# Patient Record
Sex: MALE | Race: WHITE | NOT HISPANIC OR LATINO | Employment: FULL TIME | URBAN - METROPOLITAN AREA
[De-identification: names, ages, dates, MRNs, and addresses within clinical notes are randomized per-mention and may not be internally consistent; named-entity substitution may affect disease eponyms.]

---

## 2017-02-10 RX ORDER — SILDENAFIL CITRATE 20 MG/1
TABLET ORAL
Qty: 30 TABLET | Refills: 0 | Status: SHIPPED | OUTPATIENT
Start: 2017-02-10 | End: 2017-03-07 | Stop reason: SDUPTHER

## 2017-02-10 RX ORDER — SILDENAFIL CITRATE 20 MG/1
TABLET ORAL
Qty: 90 TABLET | Refills: 0 | OUTPATIENT
Start: 2017-02-10

## 2017-03-07 ENCOUNTER — OFFICE VISIT (OUTPATIENT)
Dept: FAMILY MEDICINE CLINIC | Facility: CLINIC | Age: 62
End: 2017-03-07

## 2017-03-07 VITALS
SYSTOLIC BLOOD PRESSURE: 126 MMHG | OXYGEN SATURATION: 99 % | HEIGHT: 68 IN | BODY MASS INDEX: 27.28 KG/M2 | WEIGHT: 180 LBS | HEART RATE: 70 BPM | DIASTOLIC BLOOD PRESSURE: 84 MMHG

## 2017-03-07 DIAGNOSIS — M54.50 CHRONIC MIDLINE LOW BACK PAIN WITHOUT SCIATICA: ICD-10-CM

## 2017-03-07 DIAGNOSIS — F52.21 ED (ERECTILE DYSFUNCTION) OF NON-ORGANIC ORIGIN: ICD-10-CM

## 2017-03-07 DIAGNOSIS — E78.2 MIXED HYPERLIPIDEMIA: ICD-10-CM

## 2017-03-07 DIAGNOSIS — Z00.00 HEALTHCARE MAINTENANCE: Primary | ICD-10-CM

## 2017-03-07 DIAGNOSIS — M54.12 CERVICAL RADICULOPATHY: ICD-10-CM

## 2017-03-07 DIAGNOSIS — G89.29 CHRONIC MIDLINE LOW BACK PAIN WITHOUT SCIATICA: ICD-10-CM

## 2017-03-07 PROCEDURE — 99396 PREV VISIT EST AGE 40-64: CPT | Performed by: FAMILY MEDICINE

## 2017-03-07 PROCEDURE — 90715 TDAP VACCINE 7 YRS/> IM: CPT | Performed by: FAMILY MEDICINE

## 2017-03-07 PROCEDURE — 90471 IMMUNIZATION ADMIN: CPT | Performed by: FAMILY MEDICINE

## 2017-03-07 RX ORDER — SILDENAFIL CITRATE 20 MG/1
TABLET ORAL
Qty: 30 TABLET | Refills: 0 | Status: SHIPPED | OUTPATIENT
Start: 2017-03-07 | End: 2017-04-08 | Stop reason: SDUPTHER

## 2017-03-07 RX ORDER — SIMVASTATIN 40 MG
40 TABLET ORAL NIGHTLY
Qty: 90 TABLET | Refills: 3 | Status: SHIPPED | OUTPATIENT
Start: 2017-03-07 | End: 2018-04-03 | Stop reason: SDUPTHER

## 2017-03-07 NOTE — PROGRESS NOTES
"Subjective   Seth Montgomery is a 61 y.o. male.     Chief Complaint   Patient presents with   • Annual Exam     Social History   Substance Use Topics   • Smoking status: Current Every Day Smoker     Start date: 7/8/2015   • Smokeless tobacco: None   • Alcohol use None       History of Present Illness     Seth Montgomery 61 y.o. male who presents for yearly preventive exam.  He exercises none..  Last colon screening was colonoscopy 2 years ago without abnormalities.  Immunizations: not up to date - needs tdap, zostavax, and pneumovax  PSA was discussed and ordered He has no increased risk of prostate cancer  He does see his dentist regularly  His diet is in general, a \"healthy\" diet    He describes his alcohol intake as social drinker, down from last time he was in her.   His cardiovascular risk is: LDL goal is under 130    The following portions of the patient's history were reviewed and updated as appropriate: allergies, past medical history, past family history, surgeries, current medications, past social history and problem list.    Review of Systems   Constitutional: Negative for activity change, appetite change, chills, fatigue, fever and unexpected weight change.   HENT: Negative for congestion, ear pain, hearing loss, mouth sores, nosebleeds, rhinorrhea and sore throat.    Eyes: Negative for pain and visual disturbance.   Respiratory: Negative for cough, shortness of breath and wheezing.    Cardiovascular: Negative for chest pain, palpitations and leg swelling.   Gastrointestinal: Negative for abdominal distention, abdominal pain, blood in stool, constipation, diarrhea, nausea and vomiting.   Endocrine: Negative for cold intolerance and heat intolerance.   Genitourinary: Negative for difficulty urinating, discharge, dysuria, frequency, hematuria and urgency.   Musculoskeletal: Positive for arthralgias. Negative for back pain and joint swelling.   Skin: Negative for rash and wound.   Neurological: Positive for " "numbness (Tingling sensation in his right arm. Occurred with sleeping wrong. And his shoulder for the next two weeks hurt him as well. Some pain along the outside of his lateral arm. Occurred a month ago. ). Negative for dizziness, weakness and headaches.         long hx of spine pain-- four or five mos ago with LBP. Non-radiating. Dull, can't bend over to get to his shoes. Better with a shower and stretching.    Hematological: Does not bruise/bleed easily.   Psychiatric/Behavioral: Negative for confusion, dysphoric mood, sleep disturbance and suicidal ideas. The patient is not nervous/anxious.        Objective   Vitals:    03/07/17 1038   BP: 126/84   Pulse: 70   SpO2: 99%   Weight: 180 lb (81.6 kg)   Height: 68\" (172.7 cm)     Body mass index is 27.37 kg/(m^2).    Physical Exam   Constitutional: He is oriented to person, place, and time. He appears well-developed and well-nourished. No distress.   HENT:   Head: Normocephalic and atraumatic.   Right Ear: Tympanic membrane, external ear and ear canal normal.   Left Ear: Tympanic membrane, external ear and ear canal normal.   Mouth/Throat: Oropharynx is clear and moist.   Eyes: Conjunctivae are normal.   Neck: Neck supple. No thyromegaly present.   Cardiovascular: Normal rate, regular rhythm, normal heart sounds and intact distal pulses.    No murmur heard.  Pulmonary/Chest: Effort normal and breath sounds normal. No respiratory distress. He has no wheezes. He has no rales.   Abdominal: Soft. Bowel sounds are normal. He exhibits no distension and no mass. There is no tenderness.   Musculoskeletal: Normal range of motion. He exhibits no edema or deformity.   Tender, mildly, above the pelvic bones bilaterally   Lymphadenopathy:     He has no cervical adenopathy.   Neurological: He is alert and oriented to person, place, and time.   Skin: Skin is warm and dry.   Psychiatric: He has a normal mood and affect. His behavior is normal. Judgment and thought content normal. "   Vitals reviewed.      Assessment/Plan   Problem List Items Addressed This Visit        Cardiovascular and Mediastinum    HLD (hyperlipidemia)    Relevant Medications    simvastatin (ZOCOR) 40 MG tablet       Other    ED (erectile dysfunction) of non-organic origin    Relevant Medications    sildenafil (REVATIO) 20 MG tablet      Other Visit Diagnoses     Healthcare maintenance    -  Primary    Relevant Orders    Comprehensive metabolic panel    Lipid Panel With LDL/HDL Ratio    CBC and Differential    PSA    Cervical radiculopathy        If his symptoms persist he will need a MRI of the neck.  I've encouraged him to move more as he sitting with bad posture at a desk all day    Chronic midline low back pain without sciatica        This sounds very fixable with more activity.  He needs to be out on his bike.

## 2017-03-08 LAB
ALBUMIN SERPL-MCNC: 4.6 G/DL (ref 3.5–5.2)
ALBUMIN/GLOB SERPL: 2 G/DL
ALP SERPL-CCNC: 39 U/L (ref 39–117)
ALT SERPL-CCNC: 16 U/L (ref 1–41)
AST SERPL-CCNC: 16 U/L (ref 1–40)
BASOPHILS # BLD AUTO: 0.03 10*3/MM3 (ref 0–0.2)
BASOPHILS NFR BLD AUTO: 0.7 % (ref 0–1.5)
BILIRUB SERPL-MCNC: 0.4 MG/DL (ref 0.1–1.2)
BUN SERPL-MCNC: 17 MG/DL (ref 8–23)
BUN/CREAT SERPL: 19.8 (ref 7–25)
CALCIUM SERPL-MCNC: 10.3 MG/DL (ref 8.6–10.5)
CHLORIDE SERPL-SCNC: 102 MMOL/L (ref 98–107)
CHOLEST SERPL-MCNC: 251 MG/DL (ref 0–200)
CO2 SERPL-SCNC: 27.2 MMOL/L (ref 22–29)
CREAT SERPL-MCNC: 0.86 MG/DL (ref 0.76–1.27)
EOSINOPHIL # BLD AUTO: 0.07 10*3/MM3 (ref 0–0.7)
EOSINOPHIL NFR BLD AUTO: 1.7 % (ref 0.3–6.2)
ERYTHROCYTE [DISTWIDTH] IN BLOOD BY AUTOMATED COUNT: 12.5 % (ref 11.5–14.5)
GLOBULIN SER CALC-MCNC: 2.3 GM/DL
GLUCOSE SERPL-MCNC: 96 MG/DL (ref 65–99)
HCT VFR BLD AUTO: 42.6 % (ref 40.4–52.2)
HDLC SERPL-MCNC: 77 MG/DL (ref 40–60)
HGB BLD-MCNC: 14.5 G/DL (ref 13.7–17.6)
IMM GRANULOCYTES # BLD: 0 10*3/MM3 (ref 0–0.03)
IMM GRANULOCYTES NFR BLD: 0 % (ref 0–0.5)
LDLC SERPL CALC-MCNC: 156 MG/DL (ref 0–100)
LDLC/HDLC SERPL: 2.03 {RATIO}
LYMPHOCYTES # BLD AUTO: 1.65 10*3/MM3 (ref 0.9–4.8)
LYMPHOCYTES NFR BLD AUTO: 40.9 % (ref 19.6–45.3)
MCH RBC QN AUTO: 33.1 PG (ref 27–32.7)
MCHC RBC AUTO-ENTMCNC: 34 G/DL (ref 32.6–36.4)
MCV RBC AUTO: 97.3 FL (ref 79.8–96.2)
MONOCYTES # BLD AUTO: 0.35 10*3/MM3 (ref 0.2–1.2)
MONOCYTES NFR BLD AUTO: 8.7 % (ref 5–12)
NEUTROPHILS # BLD AUTO: 1.93 10*3/MM3 (ref 1.9–8.1)
NEUTROPHILS NFR BLD AUTO: 48 % (ref 42.7–76)
PLATELET # BLD AUTO: 239 10*3/MM3 (ref 140–500)
POTASSIUM SERPL-SCNC: 4.5 MMOL/L (ref 3.5–5.2)
PROT SERPL-MCNC: 6.9 G/DL (ref 6–8.5)
PSA SERPL-MCNC: 1.72 NG/ML (ref 0–4)
RBC # BLD AUTO: 4.38 10*6/MM3 (ref 4.6–6)
SODIUM SERPL-SCNC: 143 MMOL/L (ref 136–145)
TRIGL SERPL-MCNC: 89 MG/DL (ref 0–150)
VLDLC SERPL CALC-MCNC: 17.8 MG/DL (ref 5–40)
WBC # BLD AUTO: 4.03 10*3/MM3 (ref 4.5–10.7)

## 2017-04-08 DIAGNOSIS — F52.21 ED (ERECTILE DYSFUNCTION) OF NON-ORGANIC ORIGIN: ICD-10-CM

## 2017-04-10 RX ORDER — SILDENAFIL CITRATE 20 MG/1
TABLET ORAL
Qty: 30 TABLET | Refills: 0 | Status: SHIPPED | OUTPATIENT
Start: 2017-04-10 | End: 2017-05-26

## 2017-04-26 DIAGNOSIS — Z00.00 HEALTHCARE MAINTENANCE: Primary | ICD-10-CM

## 2017-04-27 ENCOUNTER — LAB (OUTPATIENT)
Dept: FAMILY MEDICINE CLINIC | Facility: CLINIC | Age: 62
End: 2017-04-27

## 2017-04-27 DIAGNOSIS — Z00.00 HEALTHCARE MAINTENANCE: ICD-10-CM

## 2017-04-27 LAB
BASOPHILS # BLD AUTO: 0.03 10*3/MM3 (ref 0–0.2)
BASOPHILS NFR BLD AUTO: 0.8 % (ref 0–1.5)
CHOLEST SERPL-MCNC: 218 MG/DL (ref 0–200)
EOSINOPHIL # BLD AUTO: 0.09 10*3/MM3 (ref 0–0.7)
EOSINOPHIL NFR BLD AUTO: 2.3 % (ref 0.3–6.2)
ERYTHROCYTE [DISTWIDTH] IN BLOOD BY AUTOMATED COUNT: 12.6 % (ref 11.5–14.5)
HCT VFR BLD AUTO: 42.1 % (ref 40.4–52.2)
HDLC SERPL-MCNC: 66 MG/DL (ref 40–60)
HGB BLD-MCNC: 13.8 G/DL (ref 13.7–17.6)
IMM GRANULOCYTES # BLD: 0 10*3/MM3 (ref 0–0.03)
IMM GRANULOCYTES NFR BLD: 0 % (ref 0–0.5)
LDLC SERPL CALC-MCNC: 123 MG/DL (ref 0–100)
LDLC/HDLC SERPL: 1.87 {RATIO}
LYMPHOCYTES # BLD AUTO: 1.65 10*3/MM3 (ref 0.9–4.8)
LYMPHOCYTES NFR BLD AUTO: 43 % (ref 19.6–45.3)
MCH RBC QN AUTO: 32.6 PG (ref 27–32.7)
MCHC RBC AUTO-ENTMCNC: 32.8 G/DL (ref 32.6–36.4)
MCV RBC AUTO: 99.5 FL (ref 79.8–96.2)
MONOCYTES # BLD AUTO: 0.26 10*3/MM3 (ref 0.2–1.2)
MONOCYTES NFR BLD AUTO: 6.8 % (ref 5–12)
NEUTROPHILS # BLD AUTO: 1.81 10*3/MM3 (ref 1.9–8.1)
NEUTROPHILS NFR BLD AUTO: 47.1 % (ref 42.7–76)
PLATELET # BLD AUTO: 183 10*3/MM3 (ref 140–500)
RBC # BLD AUTO: 4.23 10*6/MM3 (ref 4.6–6)
TRIGL SERPL-MCNC: 143 MG/DL (ref 0–150)
VLDLC SERPL CALC-MCNC: 28.6 MG/DL (ref 5–40)
WBC # BLD AUTO: 3.84 10*3/MM3 (ref 4.5–10.7)

## 2017-05-03 DIAGNOSIS — F52.21 ED (ERECTILE DYSFUNCTION) OF NON-ORGANIC ORIGIN: ICD-10-CM

## 2017-05-04 RX ORDER — SILDENAFIL CITRATE 20 MG/1
TABLET ORAL
Qty: 30 TABLET | Refills: 0 | Status: SHIPPED | OUTPATIENT
Start: 2017-05-04 | End: 2017-06-19 | Stop reason: SDUPTHER

## 2017-05-24 DIAGNOSIS — D72.819 LEUKOPENIA, UNSPECIFIED TYPE: Primary | ICD-10-CM

## 2017-05-26 ENCOUNTER — OFFICE VISIT (OUTPATIENT)
Dept: FAMILY MEDICINE CLINIC | Facility: CLINIC | Age: 62
End: 2017-05-26

## 2017-05-26 VITALS
HEART RATE: 68 BPM | HEIGHT: 68 IN | SYSTOLIC BLOOD PRESSURE: 118 MMHG | DIASTOLIC BLOOD PRESSURE: 78 MMHG | OXYGEN SATURATION: 99 % | WEIGHT: 182 LBS | BODY MASS INDEX: 27.58 KG/M2

## 2017-05-26 DIAGNOSIS — D70.9 NEUTROPENIA, UNSPECIFIED TYPE (HCC): ICD-10-CM

## 2017-05-26 DIAGNOSIS — K57.32 DIVERTICULITIS OF LARGE INTESTINE WITHOUT PERFORATION OR ABSCESS WITHOUT BLEEDING: Primary | ICD-10-CM

## 2017-05-26 PROBLEM — Z87.19 HISTORY OF DIVERTICULITIS: Status: ACTIVE | Noted: 2017-05-26

## 2017-05-26 PROCEDURE — 99214 OFFICE O/P EST MOD 30 MIN: CPT | Performed by: FAMILY MEDICINE

## 2017-06-19 DIAGNOSIS — F52.21 ED (ERECTILE DYSFUNCTION) OF NON-ORGANIC ORIGIN: ICD-10-CM

## 2017-06-19 RX ORDER — SILDENAFIL CITRATE 20 MG/1
TABLET ORAL
Qty: 30 TABLET | Refills: 0 | Status: SHIPPED | OUTPATIENT
Start: 2017-06-19 | End: 2017-08-31 | Stop reason: SDUPTHER

## 2017-06-23 ENCOUNTER — CONSULT (OUTPATIENT)
Dept: ONCOLOGY | Facility: CLINIC | Age: 62
End: 2017-06-23

## 2017-06-23 ENCOUNTER — LAB (OUTPATIENT)
Dept: LAB | Facility: HOSPITAL | Age: 62
End: 2017-06-23

## 2017-06-23 VITALS
HEIGHT: 67 IN | BODY MASS INDEX: 28.75 KG/M2 | OXYGEN SATURATION: 97 % | WEIGHT: 183.2 LBS | TEMPERATURE: 98.3 F | RESPIRATION RATE: 16 BRPM | DIASTOLIC BLOOD PRESSURE: 78 MMHG | SYSTOLIC BLOOD PRESSURE: 138 MMHG | HEART RATE: 80 BPM

## 2017-06-23 DIAGNOSIS — D72.819 LEUKOPENIA, UNSPECIFIED TYPE: Primary | ICD-10-CM

## 2017-06-23 DIAGNOSIS — D72.818 OTHER DECREASED WHITE BLOOD CELL (WBC) COUNT: Primary | ICD-10-CM

## 2017-06-23 LAB
BASOPHILS # BLD AUTO: 0.04 10*3/MM3 (ref 0–0.1)
BASOPHILS NFR BLD AUTO: 1 % (ref 0–1.1)
DEPRECATED RDW RBC AUTO: 42.4 FL (ref 37–49)
EOSINOPHIL # BLD AUTO: 0.02 10*3/MM3 (ref 0–0.36)
EOSINOPHIL NFR BLD AUTO: 0.5 % (ref 1–5)
ERYTHROCYTE [DISTWIDTH] IN BLOOD BY AUTOMATED COUNT: 12 % (ref 11.7–14.5)
HCT VFR BLD AUTO: 39.7 % (ref 40–49)
HGB BLD-MCNC: 13.7 G/DL (ref 13.5–16.5)
IMM GRANULOCYTES # BLD: 0.01 10*3/MM3 (ref 0–0.03)
IMM GRANULOCYTES NFR BLD: 0.3 % (ref 0–0.5)
LYMPHOCYTES # BLD AUTO: 1.27 10*3/MM3 (ref 1–3.5)
LYMPHOCYTES NFR BLD AUTO: 33.1 % (ref 20–49)
MCH RBC QN AUTO: 32.7 PG (ref 27–33)
MCHC RBC AUTO-ENTMCNC: 34.5 G/DL (ref 32–35)
MCV RBC AUTO: 94.7 FL (ref 83–97)
MONOCYTES # BLD AUTO: 0.31 10*3/MM3 (ref 0.25–0.8)
MONOCYTES NFR BLD AUTO: 8.1 % (ref 4–12)
NEUTROPHILS # BLD AUTO: 2.19 10*3/MM3 (ref 1.5–7)
NEUTROPHILS NFR BLD AUTO: 57 % (ref 39–75)
NRBC BLD MANUAL-RTO: 0 /100 WBC (ref 0–0)
PLATELET # BLD AUTO: 173 10*3/MM3 (ref 150–375)
PMV BLD AUTO: 10 FL (ref 8.9–12.1)
RBC # BLD AUTO: 4.19 10*6/MM3 (ref 4.3–5.5)
VIT B12 BLD-MCNC: 388 PG/ML (ref 250–999)
WBC NRBC COR # BLD: 3.84 10*3/MM3 (ref 4–10)

## 2017-06-23 PROCEDURE — 36415 COLL VENOUS BLD VENIPUNCTURE: CPT | Performed by: INTERNAL MEDICINE

## 2017-06-23 PROCEDURE — 99245 OFF/OP CONSLTJ NEW/EST HI 55: CPT | Performed by: INTERNAL MEDICINE

## 2017-06-23 PROCEDURE — 85025 COMPLETE CBC W/AUTO DIFF WBC: CPT | Performed by: INTERNAL MEDICINE

## 2017-06-23 PROCEDURE — 36416 COLLJ CAPILLARY BLOOD SPEC: CPT | Performed by: INTERNAL MEDICINE

## 2017-06-23 PROCEDURE — 82607 VITAMIN B-12: CPT | Performed by: INTERNAL MEDICINE

## 2017-06-23 NOTE — PROGRESS NOTES
Subjective .     REASON FOR CONSULTATION:   Leukocytopenia  Provide an opinion on any further workup or treatment                             REQUESTING PHYSICIAN: Yenny Mendoza MD  RECORDS OBTAINED:  Records of the patients history including those obtained from the referring provider were reviewed and summarized in detail.    HISTORY OF PRESENT ILLNESS:  The patient is a 62 y.o. year old male  who is here for follow-up with the above-mentioned history.    On 6/23/17 WBC 3.8.  A 4/27/17 WBC 3.8.  On 3/7/17 WBC 4  On 6/13/16 WBC 6.8.  On 1/8/16 WBC 5.1.  Hb and PLT unremarkable.  Differential unremarkable.    Denies fever or chills.  Denies unintentional significant weight loss  Denies drenching night sweats.  Denies recurrent or unusual infections.    Past Medical History:   Diagnosis Date   • Anxiety    • Depression    • Diverticula of colon    • Fasciculation    • H/O esophageal reflux    • H/O Fasciculations    • Hyperlipidemia    • Loss of hearing      Past Surgical History:   Procedure Laterality Date   • HERNIA REPAIR         HEMATOLOGIC/ONCOLOGIC HISTORY:  (History from previous dates can be found in the separate document.)    MEDICATIONS    Current Outpatient Prescriptions:   •  Calcium Carbonate Antacid (ANTACID PO), Take  by mouth As Needed., Disp: , Rfl:   •  Ibuprofen (ADVIL PO), Take  by mouth As Needed., Disp: , Rfl:   •  Nicotine Polacrilex (NICORETTE MT), Apply  to the mouth or throat As Needed., Disp: , Rfl:   •  sildenafil (REVATIO) 20 MG tablet, TAKE 1 TABLET BY MOUTH 30 MINUTES BEFORE NEED, Disp: 30 tablet, Rfl: 0  •  simvastatin (ZOCOR) 40 MG tablet, Take 1 tablet by mouth Every Night., Disp: 90 tablet, Rfl: 3    ALLERGIES:   No Known Allergies    SOCIAL HISTORY:       Social History     Social History   • Marital status:      Spouse name: [place]   • Number of children: N/A   • Years of education: N/A     Occupational History   •  Unknown Employer     Social History Main Topics   •  "Smoking status: Current Every Day Smoker     Start date: 7/8/2015   • Smokeless tobacco: Never Used      Comment: intermittently smokes, up to 1 PPD   • Alcohol use Yes      Comment: 2 glasses wine (1/2 bottle) per day   • Drug use: No   • Sexual activity: Not on file     Other Topics Concern   • Not on file     Social History Narrative         FAMILY HISTORY:  Family History   Problem Relation Age of Onset   • Transient ischemic attack Mother    • Breast cancer Mother 75   • Hypertension Father    • Von Willebrand disease Sister    • Ovarian cancer Maternal Grandmother 62       REVIEW OF SYSTEMS:  GENERAL: No change in appetite or weight;   No fevers, chills, sweats.    SKIN: No nonhealing lesions.   No rashes.  HEME/LYMPH: No easy bruising, bleeding.   No swollen nodes.   EYES: No vision changes or diplopia.   ENT: No tinnitus, hearing loss, gum bleeding, epistaxis, hoarseness or dysphagia.   RESPIRATORY: No cough, shortness of breath, hemoptysis or wheezing.   CVS: No chest pain, palpitations, orthopnea, dyspnea on exertion or PND.   GI: No melena or hematochezia.   No abdominal pain.  No nausea, vomiting, constipation, diarrhea  : No lower tract obstructive symptoms, dysuria or hematuria.   MUSCULOSKELETAL: No bone pain.  No joint stiffness.   NEUROLOGICAL: No global weakness, loss of consciousness or seizures.   PSYCHIATRIC: No increased nervousness, mood changes or depression.     Objective    Vitals:    06/23/17 1328   BP: 138/78   Pulse: 80   Resp: 16   Temp: 98.3 °F (36.8 °C)   SpO2: 97%   Weight: 183 lb 3.2 oz (83.1 kg)   Height: 67.32\" (171 cm)  Comment: new    PainSc: 0-No pain     Current Status 6/23/2017   ECOG score 0      PHYSICAL EXAM:    GENERAL:  Well-developed, well-nourished in no acute distress.   SKIN:  Warm, dry without rashes, purpura or petechiae.  EYES:  Pupils equal, round and reactive to light.  EOMs intact.  Conjunctivae normal.  EARS:  Hearing intact.  NOSE:  Septum midline.  No " excoriations or nasal discharge.  MOUTH:  Tongue is well-papillated; no stomatitis or ulcers.  Lips normal.  THROAT:  Oropharynx without lesions or exudates.  NECK:  Supple with good range of motion; no thyromegaly or masses, no JVD.  LYMPHATICS:  No cervical, supraclavicular, axillary or inguinal adenopathy.  CHEST:  Lungs clear to auscultation. Good airflow.  CARDIAC:  Regular rate and rhythm without murmurs, rubs or gallops. Normal S1,S2.  ABDOMEN:  Soft, nontender with no hepatosplenomegaly or masses.  EXTREMITIES:  No clubbing, cyanosis or edema.  NEUROLOGICAL:  Cranial Nerves II-XII grossly intact.  No focal neurological deficits.  PSYCHIATRIC:  Normal affect and mood.    RECENT LABS:        WBC   Date Value Ref Range Status   06/23/2017 3.84 (L) 4.00 - 10.00 10*3/mm3 Final   04/27/2017 3.84 (L) 4.50 - 10.70 10*3/mm3 Final   03/07/2017 4.03 (L) 4.50 - 10.70 10*3/mm3 Final   06/13/2016 6.84 4.50 - 10.70 10*3/mm3 Final   01/08/2016 5.1 3.4 - 10.8 x10E3/uL Final     Hemoglobin   Date Value Ref Range Status   06/23/2017 13.7 13.5 - 16.5 g/dL Final   04/27/2017 13.8 13.7 - 17.6 g/dL Final   03/07/2017 14.5 13.7 - 17.6 g/dL Final   06/13/2016 13.2 (L) 13.7 - 17.6 g/dL Final   01/08/2016 14.3 12.6 - 17.7 g/dL Final     Platelets   Date Value Ref Range Status   06/23/2017 173 150 - 375 10*3/mm3 Final   04/27/2017 183 140 - 500 10*3/mm3 Final   03/07/2017 239 140 - 500 10*3/mm3 Final   06/13/2016 219 140 - 500 10*3/mm3 Final   01/08/2016 222 150 - 379 x10E3/uL Final       Assessment/Plan   Other decreased white blood cell (WBC) count  - Vitamin B12  - Folate RBC  - CBC & Differential  - CBC & Differential  1.  Leukocytopenia.  Mild.  Hb and PLT unremarkable.  Differential unremarkable.  Peripheral smear personally reviewed by me and looks unremarkable.  Check B12 and folate studies.  I explained to the patient we could do a bone marrow biopsy for more information.  However, with his WBC being just mildly low and other  cell lines looking fine and peripheral smear looking unremarkable, I think it is reasonable to avoid putting him through a bone marrow biopsy and instead following his CBC.  He asked me I think I'll call could be contributing.  I explained it is possible that drinking half bottle of wine per night every night has caused leukocytopenia.  I explained I doubt stopping alcohol we correct his WBC.  However, I do think it would be beneficial to have a few days per week with no alcohol and ideally drink 1 glass of wine instead of 2.  I recommended he try and lose some weight (mildly overweight, carries his weight centrally). (again, i explained i don't think weight loss will improve wbc, but he wanted recommendations on ways to improve his health).    2.  Smoking. Recommended smoking cessation.    Plan  · B12 and folate labs today  · CBC with RN review 3 months  · M.D. CBC 6 months

## 2017-06-26 LAB
FOLATE BLD-MCNC: 350 NG/ML
FOLATE RBC-MCNC: 868 NG/ML
HCT VFR BLD AUTO: 40.3 % (ref 37.5–51)

## 2017-07-31 ENCOUNTER — OFFICE VISIT (OUTPATIENT)
Dept: FAMILY MEDICINE CLINIC | Facility: CLINIC | Age: 62
End: 2017-07-31

## 2017-07-31 VITALS
DIASTOLIC BLOOD PRESSURE: 78 MMHG | HEIGHT: 68 IN | BODY MASS INDEX: 29.55 KG/M2 | SYSTOLIC BLOOD PRESSURE: 114 MMHG | OXYGEN SATURATION: 100 % | HEART RATE: 66 BPM | WEIGHT: 195 LBS

## 2017-07-31 DIAGNOSIS — M54.2 ANTERIOR NECK PAIN: ICD-10-CM

## 2017-07-31 DIAGNOSIS — G44.019 EPISODIC CLUSTER HEADACHE, NOT INTRACTABLE: Primary | ICD-10-CM

## 2017-07-31 PROCEDURE — 99214 OFFICE O/P EST MOD 30 MIN: CPT | Performed by: FAMILY MEDICINE

## 2017-07-31 NOTE — PROGRESS NOTES
Seth Montgomery is a 62 y.o. male.  Seen 07/31/2017    Assessment/Plan   Problem List Items Addressed This Visit     None      Visit Diagnoses     Episodic cluster headache, not intractable    -  Primary    Relevant Orders    CT Head With & Without Contrast    SCM strain most likely diagnosis                 Return for Dependent on test results.  Patient Instructions   If neck discomfort doesn't go away he may need further w/u but believe the issue is strain.    Suspect CT head will be normal. Maybe a mild return of the cluster HA brought about by the new lifting.       Subjective     Chief Complaint   Patient presents with   • Neck Pain     x 3 weeks    • Headache     Social History   Substance Use Topics   • Smoking status: Current Every Day Smoker     Start date: 7/8/2015   • Smokeless tobacco: Never Used      Comment: intermittently smokes, up to 1 PPD   • Alcohol use Yes      Comment: 2 glasses wine (1/2 bottle) per day       History of Present Illness     Neck Pain: Paitent complains of neck pain. Event that precipitate these symptoms: none known. Onset of symptoms 3 weeks ago, unchanged since that time. Current symptoms are pain in anterior in character (pulsing in character; 1/10 in severity). Patient denies any other issues. Having some HAs that are in the left side of his face and upper scalp. Hx of migraines, possibly cluster many many years ago. Patient has had no prior neck problems.  Previous treatments include: none. Took advil. Will last for an hour or so and is reminiscent of the migraines he used to get. Has been lifting lately -- putting a canoe on his car. HA issues is the same time period.HA usually occurs in the afternoon. Not the morning.      The following portions of the patient's history were reviewed and updated as appropriate:PMHroutine: Social history , Allergies, Current Medications, Active Problem List and Health Maintenance    Review of Systems   Constitutional: Negative for activity  "change, appetite change, chills, fatigue, fever and unexpected weight change.   HENT: Negative for congestion, ear pain, hearing loss, mouth sores, nosebleeds, rhinorrhea and sore throat.    Eyes: Positive for pain. Negative for visual disturbance.   Respiratory: Negative for cough, shortness of breath and wheezing.    Cardiovascular: Negative for chest pain, palpitations and leg swelling.   Gastrointestinal: Negative for abdominal distention, abdominal pain, blood in stool, constipation, diarrhea, nausea and vomiting.   Endocrine: Negative for cold intolerance and heat intolerance.   Genitourinary: Negative for difficulty urinating, discharge, dysuria, frequency, hematuria and urgency.   Musculoskeletal: Positive for myalgias. Negative for back pain and joint swelling.   Skin: Negative for rash and wound.   Neurological: Positive for headaches. Negative for dizziness, weakness and numbness.   Hematological: Does not bruise/bleed easily.   Psychiatric/Behavioral: Negative for confusion, dysphoric mood, sleep disturbance and suicidal ideas. The patient is not nervous/anxious.        Objective   Vitals:    07/31/17 1159   BP: 114/78   Pulse: 66   SpO2: 100%   Weight: 195 lb (88.5 kg)   Height: 68\" (172.7 cm)     Body mass index is 29.65 kg/(m^2).  Physical Exam   Constitutional: He appears well-developed and well-nourished.   Musculoskeletal: Normal range of motion. He exhibits no tenderness.   Neurological: He is alert. No cranial nerve deficit. Coordination normal.   Psychiatric: He has a normal mood and affect. His behavior is normal. Judgment and thought content normal.   Vitals reviewed.    Reviewed Data:        "

## 2017-07-31 NOTE — PATIENT INSTRUCTIONS
If neck discomfort doesn't go away he may need further w/u but believe the issue is strain.    Suspect CT head will be normal. Maybe a mild return of the cluster HA brought about by the new lifting.

## 2017-08-03 ENCOUNTER — HOSPITAL ENCOUNTER (OUTPATIENT)
Dept: CT IMAGING | Facility: HOSPITAL | Age: 62
Discharge: HOME | End: 2017-08-03
Admitting: FAMILY MEDICINE

## 2017-08-03 DIAGNOSIS — G44.019 EPISODIC CLUSTER HEADACHE, NOT INTRACTABLE: ICD-10-CM

## 2017-08-03 LAB — CREAT BLDA-MCNC: 0.8 MG/DL (ref 0.6–1.3)

## 2017-08-03 PROCEDURE — 82565 ASSAY OF CREATININE: CPT

## 2017-08-03 PROCEDURE — 0 IOPAMIDOL PER 1 ML: Performed by: FAMILY MEDICINE

## 2017-08-03 PROCEDURE — 70470 CT HEAD/BRAIN W/O & W/DYE: CPT

## 2017-08-03 RX ADMIN — IOPAMIDOL 50 ML: 755 INJECTION, SOLUTION INTRAVENOUS at 13:45

## 2017-08-04 ENCOUNTER — TELEPHONE (OUTPATIENT)
Dept: FAMILY MEDICINE CLINIC | Facility: CLINIC | Age: 62
End: 2017-08-04

## 2017-08-31 DIAGNOSIS — F52.21 ED (ERECTILE DYSFUNCTION) OF NON-ORGANIC ORIGIN: ICD-10-CM

## 2017-08-31 RX ORDER — SILDENAFIL CITRATE 20 MG/1
TABLET ORAL
Qty: 30 TABLET | Refills: 0 | Status: SHIPPED | OUTPATIENT
Start: 2017-08-31 | End: 2017-08-31 | Stop reason: SDUPTHER

## 2017-09-01 DIAGNOSIS — F52.21 ED (ERECTILE DYSFUNCTION) OF NON-ORGANIC ORIGIN: ICD-10-CM

## 2017-09-01 RX ORDER — SILDENAFIL CITRATE 20 MG/1
TABLET ORAL
Qty: 90 TABLET | Refills: 0 | Status: SHIPPED | OUTPATIENT
Start: 2017-09-01 | End: 2017-12-08

## 2017-09-01 RX ORDER — SILDENAFIL CITRATE 20 MG/1
TABLET ORAL
Qty: 90 TABLET | Refills: 0 | OUTPATIENT
Start: 2017-09-01

## 2017-09-12 ENCOUNTER — LAB (OUTPATIENT)
Dept: LAB | Facility: HOSPITAL | Age: 62
End: 2017-09-12

## 2017-09-12 ENCOUNTER — CLINICAL SUPPORT (OUTPATIENT)
Dept: ONCOLOGY | Facility: HOSPITAL | Age: 62
End: 2017-09-12

## 2017-09-12 DIAGNOSIS — D72.818 OTHER DECREASED WHITE BLOOD CELL (WBC) COUNT: ICD-10-CM

## 2017-09-12 LAB
BASOPHILS # BLD AUTO: 0.04 10*3/MM3 (ref 0–0.1)
BASOPHILS NFR BLD AUTO: 0.8 % (ref 0–1.1)
DEPRECATED RDW RBC AUTO: 43.7 FL (ref 37–49)
EOSINOPHIL # BLD AUTO: 0.13 10*3/MM3 (ref 0–0.36)
EOSINOPHIL NFR BLD AUTO: 2.5 % (ref 1–5)
ERYTHROCYTE [DISTWIDTH] IN BLOOD BY AUTOMATED COUNT: 12.1 % (ref 11.7–14.5)
HCT VFR BLD AUTO: 42.3 % (ref 40–49)
HGB BLD-MCNC: 14.4 G/DL (ref 13.5–16.5)
IMM GRANULOCYTES # BLD: 0.05 10*3/MM3 (ref 0–0.03)
IMM GRANULOCYTES NFR BLD: 1 % (ref 0–0.5)
LYMPHOCYTES # BLD AUTO: 2.38 10*3/MM3 (ref 1–3.5)
LYMPHOCYTES NFR BLD AUTO: 46.6 % (ref 20–49)
MCH RBC QN AUTO: 32.7 PG (ref 27–33)
MCHC RBC AUTO-ENTMCNC: 34 G/DL (ref 32–35)
MCV RBC AUTO: 95.9 FL (ref 83–97)
MONOCYTES # BLD AUTO: 0.42 10*3/MM3 (ref 0.25–0.8)
MONOCYTES NFR BLD AUTO: 8.2 % (ref 4–12)
NEUTROPHILS # BLD AUTO: 2.09 10*3/MM3 (ref 1.5–7)
NEUTROPHILS NFR BLD AUTO: 40.9 % (ref 39–75)
NRBC BLD MANUAL-RTO: 0 /100 WBC (ref 0–0)
PLATELET # BLD AUTO: 195 10*3/MM3 (ref 150–375)
PMV BLD AUTO: 10 FL (ref 8.9–12.1)
RBC # BLD AUTO: 4.41 10*6/MM3 (ref 4.3–5.5)
WBC NRBC COR # BLD: 5.11 10*3/MM3 (ref 4–10)

## 2017-09-12 PROCEDURE — 85025 COMPLETE CBC W/AUTO DIFF WBC: CPT

## 2017-09-12 NOTE — PROGRESS NOTES
CBC results reviewed with pt. WBC 5.11, 14.4, . Also, reviewed results of vit b12 and folate that were drawn in June. Pt denies complaints today. Pt to return as scheduled in December for MD reardon. Pt encouraged to call with any complaints. He v/u. Copy of CBC results given to pt.

## 2017-10-18 DIAGNOSIS — F52.21 ED (ERECTILE DYSFUNCTION) OF NON-ORGANIC ORIGIN: ICD-10-CM

## 2017-10-18 RX ORDER — SILDENAFIL CITRATE 20 MG/1
TABLET ORAL
Qty: 30 TABLET | Refills: 0 | Status: SHIPPED | OUTPATIENT
Start: 2017-10-18 | End: 2017-10-18 | Stop reason: SDUPTHER

## 2017-10-19 RX ORDER — SILDENAFIL CITRATE 20 MG/1
TABLET ORAL
Qty: 90 TABLET | Refills: 0 | Status: SHIPPED | OUTPATIENT
Start: 2017-10-19 | End: 2018-05-23 | Stop reason: SDUPTHER

## 2017-12-08 ENCOUNTER — OFFICE VISIT (OUTPATIENT)
Dept: ONCOLOGY | Facility: CLINIC | Age: 62
End: 2017-12-08

## 2017-12-08 ENCOUNTER — LAB (OUTPATIENT)
Dept: LAB | Facility: HOSPITAL | Age: 62
End: 2017-12-08

## 2017-12-08 VITALS
SYSTOLIC BLOOD PRESSURE: 122 MMHG | WEIGHT: 178 LBS | TEMPERATURE: 98.2 F | HEART RATE: 66 BPM | RESPIRATION RATE: 16 BRPM | DIASTOLIC BLOOD PRESSURE: 70 MMHG | HEIGHT: 68 IN | BODY MASS INDEX: 26.98 KG/M2

## 2017-12-08 DIAGNOSIS — D72.818 OTHER DECREASED WHITE BLOOD CELL (WBC) COUNT: Primary | ICD-10-CM

## 2017-12-08 DIAGNOSIS — D72.818 OTHER DECREASED WHITE BLOOD CELL (WBC) COUNT: ICD-10-CM

## 2017-12-08 LAB
BASOPHILS # BLD AUTO: 0.05 10*3/MM3 (ref 0–0.1)
BASOPHILS NFR BLD AUTO: 1.3 % (ref 0–1.1)
DEPRECATED RDW RBC AUTO: 41 FL (ref 37–49)
EOSINOPHIL # BLD AUTO: 0.07 10*3/MM3 (ref 0–0.36)
EOSINOPHIL NFR BLD AUTO: 1.8 % (ref 1–5)
ERYTHROCYTE [DISTWIDTH] IN BLOOD BY AUTOMATED COUNT: 11.8 % (ref 11.7–14.5)
HCT VFR BLD AUTO: 42.7 % (ref 40–49)
HGB BLD-MCNC: 14.7 G/DL (ref 13.5–16.5)
IMM GRANULOCYTES # BLD: 0.01 10*3/MM3 (ref 0–0.03)
IMM GRANULOCYTES NFR BLD: 0.3 % (ref 0–0.5)
LYMPHOCYTES # BLD AUTO: 1.45 10*3/MM3 (ref 1–3.5)
LYMPHOCYTES NFR BLD AUTO: 37.4 % (ref 20–49)
MCH RBC QN AUTO: 32.5 PG (ref 27–33)
MCHC RBC AUTO-ENTMCNC: 34.4 G/DL (ref 32–35)
MCV RBC AUTO: 94.3 FL (ref 83–97)
MONOCYTES # BLD AUTO: 0.32 10*3/MM3 (ref 0.25–0.8)
MONOCYTES NFR BLD AUTO: 8.2 % (ref 4–12)
NEUTROPHILS # BLD AUTO: 1.98 10*3/MM3 (ref 1.5–7)
NEUTROPHILS NFR BLD AUTO: 51 % (ref 39–75)
NRBC BLD MANUAL-RTO: 0 /100 WBC (ref 0–0)
PLATELET # BLD AUTO: 201 10*3/MM3 (ref 150–375)
PMV BLD AUTO: 10 FL (ref 8.9–12.1)
RBC # BLD AUTO: 4.53 10*6/MM3 (ref 4.3–5.5)
WBC NRBC COR # BLD: 3.88 10*3/MM3 (ref 4–10)

## 2017-12-08 PROCEDURE — 99213 OFFICE O/P EST LOW 20 MIN: CPT | Performed by: INTERNAL MEDICINE

## 2017-12-08 PROCEDURE — 36416 COLLJ CAPILLARY BLOOD SPEC: CPT | Performed by: INTERNAL MEDICINE

## 2017-12-08 PROCEDURE — 85025 COMPLETE CBC W/AUTO DIFF WBC: CPT | Performed by: INTERNAL MEDICINE

## 2017-12-08 NOTE — PROGRESS NOTES
Subjective .     REASON FOR FOLLOWUP :   Leukocytopenia    HISTORY OF PRESENT ILLNESS:  The patient is a 62 y.o. year old male  who is here for follow-up with the above-mentioned history.    Denies fever or chills.  Denies unintentional significant weight loss  Denies drenching night sweats.  Denies any infections since his last visit.    Past Medical History:   Diagnosis Date   • Anxiety    • Depression    • Diverticula of colon    • Diverticulitis 2005   • Fasciculation    • H/O esophageal reflux    • H/O Fasciculations    • History of foreign travel     [place] [facility]   • Hyperlipidemia    • Loss of hearing      Past Surgical History:   Procedure Laterality Date   • HERNIA REPAIR         HEMATOLOGIC/ONCOLOGIC HISTORY:  (History from previous dates can be found in the separate document.)    MEDICATIONS    Current Outpatient Prescriptions:   •  Calcium Carbonate Antacid (ANTACID PO), Take  by mouth As Needed., Disp: , Rfl:   •  Ibuprofen (ADVIL PO), Take  by mouth As Needed., Disp: , Rfl:   •  Nicotine Polacrilex (NICORETTE MT), Apply  to the mouth or throat As Needed., Disp: , Rfl:   •  Probiotic Product (PROBIOTIC DAILY PO), Take  by mouth., Disp: , Rfl:   •  sildenafil (REVATIO) 20 MG tablet, TAKE 1 TABLET BY MOUTH 30 MINUTES BEFORE NEEDED, Disp: 90 tablet, Rfl: 0  •  simvastatin (ZOCOR) 40 MG tablet, Take 1 tablet by mouth Every Night., Disp: 90 tablet, Rfl: 3    ALLERGIES:   No Known Allergies    SOCIAL HISTORY:       Social History     Social History   • Marital status:      Spouse name: N/A   • Number of children: N/A   • Years of education: College     Occupational History   •       [employer]     Social History Main Topics   • Smoking status: Current Every Day Smoker     Packs/day: 0.50     Years: 5.50     Start date: 7/8/2015   • Smokeless tobacco: Never Used      Comment: intermittently smokes, up to 1 PPD   • Alcohol use 8.4 oz/week     14 Glasses of wine  "per week      Comment: 2 glasses wine (1/2 bottle) per day   • Drug use: No   • Sexual activity: Not on file     Other Topics Concern   • Not on file     Social History Narrative         FAMILY HISTORY:  Family History   Problem Relation Age of Onset   • Transient ischemic attack Mother    • Breast cancer Mother 75   • Von Willebrand disease Mother    • Hypertension Father    • Heart disease Father    • Von Willebrand disease Sister    • Ovarian cancer Maternal Grandmother 62       REVIEW OF SYSTEMS:  GENERAL: No change in appetite or weight;   No fevers, chills, sweats.    SKIN: No nonhealing lesions.   No rashes.  HEME/LYMPH: No easy bruising, bleeding.   No swollen nodes.   EYES: No vision changes or diplopia.   ENT: No tinnitus, hearing loss, gum bleeding, epistaxis, hoarseness or dysphagia.   RESPIRATORY: No cough, shortness of breath, hemoptysis or wheezing.   CVS: No chest pain, palpitations, orthopnea, dyspnea on exertion or PND.   GI: No melena or hematochezia.   No abdominal pain.  No nausea, vomiting, constipation, diarrhea  : No lower tract obstructive symptoms, dysuria or hematuria.   MUSCULOSKELETAL: No bone pain.  No joint stiffness.   NEUROLOGICAL: No global weakness, loss of consciousness or seizures.   PSYCHIATRIC: No increased nervousness, mood changes or depression.     Objective    Vitals:    12/08/17 1112   BP: 122/70   Pulse: 66   Resp: 16   Temp: 98.2 °F (36.8 °C)   Weight: 80.7 kg (178 lb)   Height: 172 cm (67.72\")  Comment: new ht.   PainSc: 0-No pain     Current Status 12/8/2017   ECOG score 0      PHYSICAL EXAM:    GENERAL:  Well-developed, well-nourished in no acute distress.   SKIN:  Warm, dry without rashes, purpura or petechiae.  EYES:  Pupils equal, round and reactive to light.  EOMs intact.  Conjunctivae normal.  EARS:  Hearing intact.  NOSE:  Septum midline.  No excoriations or nasal discharge.  MOUTH:  Tongue is well-papillated; no stomatitis or ulcers.  Lips normal.  THROAT:  " Oropharynx without lesions or exudates.  NECK:  Supple with good range of motion; no thyromegaly or masses, no JVD.  LYMPHATICS:  No cervical, supraclavicular, axillary or inguinal adenopathy.  CHEST:  Lungs clear to auscultation. Good airflow.  CARDIAC:  Regular rate and rhythm without murmurs, rubs or gallops. Normal S1,S2.  ABDOMEN:  Soft, nontender with no hepatosplenomegaly or masses.  EXTREMITIES:  No clubbing, cyanosis or edema.  NEUROLOGICAL:  Cranial Nerves II-XII grossly intact.  No focal neurological deficits.  PSYCHIATRIC:  Normal affect and mood.    RECENT LABS:        WBC   Date Value Ref Range Status   12/08/2017 3.88 (L) 4.00 - 10.00 10*3/mm3 Final   09/12/2017 5.11 4.00 - 10.00 10*3/mm3 Final   06/23/2017 3.84 (L) 4.00 - 10.00 10*3/mm3 Final   04/27/2017 3.84 (L) 4.50 - 10.70 10*3/mm3 Final   03/07/2017 4.03 (L) 4.50 - 10.70 10*3/mm3 Final   06/13/2016 6.84 4.50 - 10.70 10*3/mm3 Final   01/08/2016 5.1 3.4 - 10.8 x10E3/uL Final     Hemoglobin   Date Value Ref Range Status   12/08/2017 14.7 13.5 - 16.5 g/dL Final   09/12/2017 14.4 13.5 - 16.5 g/dL Final   06/23/2017 13.7 13.5 - 16.5 g/dL Final   04/27/2017 13.8 13.7 - 17.6 g/dL Final   03/07/2017 14.5 13.7 - 17.6 g/dL Final   06/13/2016 13.2 (L) 13.7 - 17.6 g/dL Final   01/08/2016 14.3 12.6 - 17.7 g/dL Final     Platelets   Date Value Ref Range Status   12/08/2017 201 150 - 375 10*3/mm3 Final   09/12/2017 195 150 - 375 10*3/mm3 Final   06/23/2017 173 150 - 375 10*3/mm3 Final   04/27/2017 183 140 - 500 10*3/mm3 Final   03/07/2017 239 140 - 500 10*3/mm3 Final   06/13/2016 219 140 - 500 10*3/mm3 Final   01/08/2016 222 150 - 379 x10E3/uL Final       Assessment/Plan   Other decreased white blood cell (WBC) count  - CBC & Differential  *Leukocytopenia.  Mild, Intermittent.  WBC 3.8-5.1 since at least April 2017.  Hb and PLT unremarkable.  Differential unremarkable.  B12 and folate unremarkable.  WBC mildly low today.    *Neutropenia.  So far, this is only  occurred once, April 2017 with ANC 1810 (normal range beginning in 1900).  ANC normal today.    *Smoking. Recommended smoking cessation.    *Regular alcohol use.  Drinks 2 glasses of wine 6 out of 7 nights which is an improvement from a prior 7 out of 7 nights.  He understands I recommend a few more days per week with no alcohol.    Plan  · M.D. CBC 1 year   · (I offered returning here as needed only with Dr. Mendoza monitoring CBC.  However, he prefers to return here, which is fine with me)

## 2018-02-06 DIAGNOSIS — F52.21 ED (ERECTILE DYSFUNCTION) OF NON-ORGANIC ORIGIN: ICD-10-CM

## 2018-02-06 RX ORDER — SILDENAFIL CITRATE 20 MG/1
TABLET ORAL
Qty: 30 TABLET | Refills: 0 | Status: SHIPPED | OUTPATIENT
Start: 2018-02-06 | End: 2018-05-23 | Stop reason: SDUPTHER

## 2018-04-03 DIAGNOSIS — E78.2 MIXED HYPERLIPIDEMIA: ICD-10-CM

## 2018-04-03 RX ORDER — SIMVASTATIN 40 MG
40 TABLET ORAL NIGHTLY
Qty: 30 TABLET | Refills: 0 | Status: SHIPPED | OUTPATIENT
Start: 2018-04-03 | End: 2018-07-02 | Stop reason: SDUPTHER

## 2018-05-23 DIAGNOSIS — F52.21 ED (ERECTILE DYSFUNCTION) OF NON-ORGANIC ORIGIN: ICD-10-CM

## 2018-05-23 RX ORDER — SILDENAFIL CITRATE 20 MG/1
TABLET ORAL
Qty: 90 TABLET | Refills: 0 | Status: SHIPPED | OUTPATIENT
Start: 2018-05-23 | End: 2018-07-02 | Stop reason: SDUPTHER

## 2018-06-25 ENCOUNTER — LAB (OUTPATIENT)
Dept: FAMILY MEDICINE CLINIC | Facility: CLINIC | Age: 63
End: 2018-06-25

## 2018-06-25 DIAGNOSIS — Z00.00 ANNUAL PHYSICAL EXAM: ICD-10-CM

## 2018-06-25 DIAGNOSIS — E78.5 HYPERLIPIDEMIA, UNSPECIFIED HYPERLIPIDEMIA TYPE: Primary | ICD-10-CM

## 2018-06-25 DIAGNOSIS — E78.5 HYPERLIPIDEMIA, UNSPECIFIED HYPERLIPIDEMIA TYPE: ICD-10-CM

## 2018-06-25 LAB
ALBUMIN SERPL-MCNC: 4.1 G/DL (ref 3.5–5.2)
ALBUMIN/GLOB SERPL: 2.1 G/DL
ALP SERPL-CCNC: 36 U/L (ref 39–117)
ALT SERPL-CCNC: 15 U/L (ref 1–41)
AST SERPL-CCNC: 16 U/L (ref 1–40)
BASOPHILS # BLD AUTO: 0.03 10*3/MM3 (ref 0–0.2)
BASOPHILS NFR BLD AUTO: 0.8 % (ref 0–1.5)
BILIRUB SERPL-MCNC: 0.7 MG/DL (ref 0.1–1.2)
BUN SERPL-MCNC: 20 MG/DL (ref 8–23)
BUN/CREAT SERPL: 22.7 (ref 7–25)
CALCIUM SERPL-MCNC: 10.1 MG/DL (ref 8.6–10.5)
CHLORIDE SERPL-SCNC: 103 MMOL/L (ref 98–107)
CHOLEST SERPL-MCNC: 196 MG/DL (ref 0–200)
CO2 SERPL-SCNC: 26.7 MMOL/L (ref 22–29)
CREAT SERPL-MCNC: 0.88 MG/DL (ref 0.76–1.27)
EOSINOPHIL # BLD AUTO: 0.11 10*3/MM3 (ref 0–0.7)
EOSINOPHIL NFR BLD AUTO: 2.9 % (ref 0.3–6.2)
ERYTHROCYTE [DISTWIDTH] IN BLOOD BY AUTOMATED COUNT: 12.8 % (ref 11.5–14.5)
GFR SERPLBLD CREATININE-BSD FMLA CKD-EPI: 106 ML/MIN/1.73
GFR SERPLBLD CREATININE-BSD FMLA CKD-EPI: 88 ML/MIN/1.73
GLOBULIN SER CALC-MCNC: 2 GM/DL
GLUCOSE SERPL-MCNC: 93 MG/DL (ref 65–99)
HCT VFR BLD AUTO: 43 % (ref 40.4–52.2)
HDLC SERPL-MCNC: 65 MG/DL (ref 40–60)
HGB BLD-MCNC: 14.4 G/DL (ref 13.7–17.6)
IMM GRANULOCYTES # BLD: 0 10*3/MM3 (ref 0–0.03)
IMM GRANULOCYTES NFR BLD: 0 % (ref 0–0.5)
LDLC SERPL CALC-MCNC: 109 MG/DL (ref 0–100)
LDLC/HDLC SERPL: 1.67 {RATIO}
LYMPHOCYTES # BLD AUTO: 1.61 10*3/MM3 (ref 0.9–4.8)
LYMPHOCYTES NFR BLD AUTO: 42.5 % (ref 19.6–45.3)
MCH RBC QN AUTO: 33.3 PG (ref 27–32.7)
MCHC RBC AUTO-ENTMCNC: 33.5 G/DL (ref 32.6–36.4)
MCV RBC AUTO: 99.3 FL (ref 79.8–96.2)
MONOCYTES # BLD AUTO: 0.34 10*3/MM3 (ref 0.2–1.2)
MONOCYTES NFR BLD AUTO: 9 % (ref 5–12)
NEUTROPHILS # BLD AUTO: 1.7 10*3/MM3 (ref 1.9–8.1)
NEUTROPHILS NFR BLD AUTO: 44.8 % (ref 42.7–76)
PLATELET # BLD AUTO: 194 10*3/MM3 (ref 140–500)
POTASSIUM SERPL-SCNC: 4.5 MMOL/L (ref 3.5–5.2)
PROT SERPL-MCNC: 6.1 G/DL (ref 6–8.5)
RBC # BLD AUTO: 4.33 10*6/MM3 (ref 4.6–6)
SODIUM SERPL-SCNC: 141 MMOL/L (ref 136–145)
TRIGL SERPL-MCNC: 111 MG/DL (ref 0–150)
VLDLC SERPL CALC-MCNC: 22.2 MG/DL (ref 5–40)
WBC # BLD AUTO: 3.79 10*3/MM3 (ref 4.5–10.7)

## 2018-07-02 ENCOUNTER — OFFICE VISIT (OUTPATIENT)
Dept: FAMILY MEDICINE CLINIC | Facility: CLINIC | Age: 63
End: 2018-07-02

## 2018-07-02 VITALS
DIASTOLIC BLOOD PRESSURE: 72 MMHG | HEART RATE: 70 BPM | BODY MASS INDEX: 27.89 KG/M2 | SYSTOLIC BLOOD PRESSURE: 112 MMHG | HEIGHT: 68 IN | RESPIRATION RATE: 16 BRPM | WEIGHT: 184 LBS | OXYGEN SATURATION: 98 %

## 2018-07-02 DIAGNOSIS — E78.2 MIXED HYPERLIPIDEMIA: ICD-10-CM

## 2018-07-02 DIAGNOSIS — F52.21 ED (ERECTILE DYSFUNCTION) OF NON-ORGANIC ORIGIN: ICD-10-CM

## 2018-07-02 DIAGNOSIS — Z00.00 HEALTHCARE MAINTENANCE: Primary | ICD-10-CM

## 2018-07-02 PROCEDURE — 99396 PREV VISIT EST AGE 40-64: CPT | Performed by: FAMILY MEDICINE

## 2018-07-02 PROCEDURE — 90732 PPSV23 VACC 2 YRS+ SUBQ/IM: CPT | Performed by: FAMILY MEDICINE

## 2018-07-02 PROCEDURE — 90632 HEPA VACCINE ADULT IM: CPT | Performed by: FAMILY MEDICINE

## 2018-07-02 PROCEDURE — 90472 IMMUNIZATION ADMIN EACH ADD: CPT | Performed by: FAMILY MEDICINE

## 2018-07-02 PROCEDURE — 90471 IMMUNIZATION ADMIN: CPT | Performed by: FAMILY MEDICINE

## 2018-07-02 RX ORDER — SIMVASTATIN 40 MG
40 TABLET ORAL NIGHTLY
Qty: 90 TABLET | Refills: 3 | Status: SHIPPED | OUTPATIENT
Start: 2018-07-02 | End: 2019-11-13 | Stop reason: SDUPTHER

## 2018-07-02 RX ORDER — SILDENAFIL CITRATE 20 MG/1
TABLET ORAL
Qty: 30 TABLET | Refills: 11 | Status: SHIPPED | OUTPATIENT
Start: 2018-07-02 | End: 2019-05-03 | Stop reason: SDUPTHER

## 2018-07-02 NOTE — PROGRESS NOTES
"  Problem List Items Addressed This Visit        Cardiovascular and Mediastinum    HLD (hyperlipidemia)    Relevant Medications    simvastatin (ZOCOR) 40 MG tablet       Other    ED (erectile dysfunction) of non-organic origin    Relevant Medications    sildenafil (REVATIO) 20 MG tablet      Other Visit Diagnoses     Healthcare maintenance    -  Primary             Return in about 1 year (around 7/2/2019).  Patient Instructions   Get a Shingrex.     Recommended that he reduce his alcohol consumption. This would     Seth Montgomery is a 63 y.o. male being seen in our office today for Annual Exam                 He  reports that he has been smoking.  He started smoking about 2 years ago. He has a 2.75 pack-year smoking history. He has never used smokeless tobacco. He reports that he drinks about 8.4 oz of alcohol per week . He reports that he does not use drugs.             HPI Seth Montgomery 63 y.o. male who presents for yearly preventive exam.  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  History; colonoscopy: Last colonoscopy: colonoscopy 2 years ago without abnormalities.  Immunizations: not up to date - hep A, pneumovax today. Rec Shingrex  PSA was reviewed He has no increased risk of prostate cancer  He does see his dentist regularly  His diet is in general, a \"healthy\" diet    He describes his alcohol intake as glass of wine with dinner  His cardiovascular risk is: LDL goal is under 130  This patient has ever been tested for HepC: yes              The following portions of the patient's history were reviewed and updated as appropriate:PMHroutine: Social history , Past Medical History, Surgical history , Allergies, Current Medications, Active Problem List, Family History and Health Maintenance            Review of Systems   Constitutional: Negative for activity change, appetite change, chills, fatigue, fever and unexpected weight change.   HENT: Negative for congestion, ear pain, hearing " loss, mouth sores, nosebleeds, rhinorrhea and sore throat.    Eyes: Negative for pain and visual disturbance.   Respiratory: Negative for cough, shortness of breath and wheezing.    Cardiovascular: Negative for chest pain, palpitations and leg swelling.   Gastrointestinal: Negative for abdominal distention, abdominal pain, blood in stool, constipation, diarrhea, nausea and vomiting.        Having some gurgling stomach episodes. Hot sauces, onions, beans, etc. Does know that alcohol may make it worse. Antacids help but is persistent. E5aaitcnly don't help. Hasn't tried PPIs. Discussed pros/cons of using those.    Endocrine: Negative for cold intolerance and heat intolerance.   Genitourinary: Negative for difficulty urinating, discharge, dysuria, frequency, hematuria and urgency.   Musculoskeletal: Negative for back pain and joint swelling.        Pain above clavicle toward the neck   Skin: Negative for rash and wound.   Neurological: Negative for dizziness, weakness, numbness and headaches.   Hematological: Does not bruise/bleed easily.   Psychiatric/Behavioral: Negative for confusion, dysphoric mood, sleep disturbance and suicidal ideas. The patient is not nervous/anxious.                  BP Readings from Last 1 Encounters:   07/02/18 112/72     Wt Readings from Last 3 Encounters:   07/02/18 83.5 kg (184 lb)   03/27/18 80.3 kg (177 lb)   12/08/17 80.7 kg (178 lb)   Body mass index is 28.39 kg/m².                 Physical Exam   Constitutional: He is oriented to person, place, and time. He appears well-developed and well-nourished. No distress.   HENT:   Head: Normocephalic and atraumatic.   Right Ear: Tympanic membrane, external ear and ear canal normal.   Left Ear: Tympanic membrane, external ear and ear canal normal.   Mouth/Throat: Oropharynx is clear and moist.   Eyes: Conjunctivae are normal.   Neck: Neck supple. No thyromegaly present.   Cardiovascular: Normal rate, regular rhythm, normal heart sounds and  intact distal pulses.    No murmur heard.  Pulmonary/Chest: Effort normal and breath sounds normal. No respiratory distress. He has no wheezes. He has no rales.   Abdominal: Soft. Bowel sounds are normal. He exhibits no distension and no mass. There is no tenderness.   Musculoskeletal: Normal range of motion. He exhibits no edema or deformity.   Lymphadenopathy:     He has no cervical adenopathy.   Neurological: He is alert and oriented to person, place, and time.   Skin: Skin is warm and dry.   Psychiatric: He has a normal mood and affect. His behavior is normal. Judgment and thought content normal.   Vitals reviewed.              Lab on 06/25/2018   Component Date Value Ref Range Status   • WBC 06/25/2018 3.79* 4.50 - 10.70 10*3/mm3 Final   • RBC 06/25/2018 4.33* 4.60 - 6.00 10*6/mm3 Final   • Hemoglobin 06/25/2018 14.4  13.7 - 17.6 g/dL Final   • Hematocrit 06/25/2018 43.0  40.4 - 52.2 % Final   • MCV 06/25/2018 99.3* 79.8 - 96.2 fL Final   • MCH 06/25/2018 33.3* 27.0 - 32.7 pg Final   • MCHC 06/25/2018 33.5  32.6 - 36.4 g/dL Final   • RDW 06/25/2018 12.8  11.5 - 14.5 % Final   • Platelets 06/25/2018 194  140 - 500 10*3/mm3 Final   • Neutrophil Rel % 06/25/2018 44.8  42.7 - 76.0 % Final   • Lymphocyte Rel % 06/25/2018 42.5  19.6 - 45.3 % Final   • Monocyte Rel % 06/25/2018 9.0  5.0 - 12.0 % Final   • Eosinophil Rel % 06/25/2018 2.9  0.3 - 6.2 % Final   • Basophil Rel % 06/25/2018 0.8  0.0 - 1.5 % Final   • Neutrophils Absolute 06/25/2018 1.70* 1.90 - 8.10 10*3/mm3 Final   • Lymphocytes Absolute 06/25/2018 1.61  0.90 - 4.80 10*3/mm3 Final   • Monocytes Absolute 06/25/2018 0.34  0.20 - 1.20 10*3/mm3 Final   • Eosinophils Absolute 06/25/2018 0.11  0.00 - 0.70 10*3/mm3 Final   • Basophils Absolute 06/25/2018 0.03  0.00 - 0.20 10*3/mm3 Final   • Immature Granulocyte Rel % 06/25/2018 0.0  0.0 - 0.5 % Final   • Immature Grans Absolute 06/25/2018 0.00  0.00 - 0.03 10*3/mm3 Final   • Glucose 06/25/2018 93  65 - 99 mg/dL  Final   • BUN 06/25/2018 20  8 - 23 mg/dL Final   • Creatinine 06/25/2018 0.88  0.76 - 1.27 mg/dL Final   • eGFR Non  Am 06/25/2018 88  >60 mL/min/1.73 Final   • eGFR African Am 06/25/2018 106  >60 mL/min/1.73 Final   • BUN/Creatinine Ratio 06/25/2018 22.7  7.0 - 25.0 Final   • Sodium 06/25/2018 141  136 - 145 mmol/L Final   • Potassium 06/25/2018 4.5  3.5 - 5.2 mmol/L Final   • Chloride 06/25/2018 103  98 - 107 mmol/L Final   • Total CO2 06/25/2018 26.7  22.0 - 29.0 mmol/L Final   • Calcium 06/25/2018 10.1  8.6 - 10.5 mg/dL Final   • Total Protein 06/25/2018 6.1  6.0 - 8.5 g/dL Final   • Albumin 06/25/2018 4.10  3.50 - 5.20 g/dL Final   • Globulin 06/25/2018 2.0  gm/dL Final   • A/G Ratio 06/25/2018 2.1  g/dL Final   • Total Bilirubin 06/25/2018 0.7  0.1 - 1.2 mg/dL Final   • Alkaline Phosphatase 06/25/2018 36* 39 - 117 U/L Final   • AST (SGOT) 06/25/2018 16  1 - 40 U/L Final   • ALT (SGPT) 06/25/2018 15  1 - 41 U/L Final   • Total Cholesterol 06/25/2018 196  0 - 200 mg/dL Final   • Triglycerides 06/25/2018 111  0 - 150 mg/dL Final   • HDL Cholesterol 06/25/2018 65* 40 - 60 mg/dL Final   • VLDL Cholesterol 06/25/2018 22.2  5 - 40 mg/dL Final   • LDL Cholesterol  06/25/2018 109* 0 - 100 mg/dL Final   • LDL/HDL Ratio 06/25/2018 1.67   Final

## 2018-08-29 ENCOUNTER — OFFICE VISIT (OUTPATIENT)
Dept: FAMILY MEDICINE CLINIC | Facility: CLINIC | Age: 63
End: 2018-08-29

## 2018-08-29 ENCOUNTER — TELEPHONE (OUTPATIENT)
Dept: GASTROENTEROLOGY | Facility: CLINIC | Age: 63
End: 2018-08-29

## 2018-08-29 ENCOUNTER — HOSPITAL ENCOUNTER (OUTPATIENT)
Dept: CT IMAGING | Facility: HOSPITAL | Age: 63
Discharge: HOME | End: 2018-08-29
Admitting: NURSE PRACTITIONER

## 2018-08-29 VITALS
HEIGHT: 68 IN | BODY MASS INDEX: 28.04 KG/M2 | DIASTOLIC BLOOD PRESSURE: 80 MMHG | OXYGEN SATURATION: 98 % | RESPIRATION RATE: 16 BRPM | HEART RATE: 80 BPM | WEIGHT: 185 LBS | SYSTOLIC BLOOD PRESSURE: 128 MMHG

## 2018-08-29 DIAGNOSIS — K57.92 DIVERTICULITIS OF INTESTINE, UNSPECIFIED BLEEDING STATUS, UNSPECIFIED COMPLICATION STATUS, UNSPECIFIED PART OF INTESTINAL TRACT: ICD-10-CM

## 2018-08-29 DIAGNOSIS — R10.32 LEFT LOWER QUADRANT PAIN: Primary | ICD-10-CM

## 2018-08-29 DIAGNOSIS — R10.32 LEFT LOWER QUADRANT PAIN: ICD-10-CM

## 2018-08-29 LAB — CREAT BLDA-MCNC: 0.9 MG/DL (ref 0.6–1.3)

## 2018-08-29 PROCEDURE — 25010000002 IOPAMIDOL 61 % SOLUTION: Performed by: NURSE PRACTITIONER

## 2018-08-29 PROCEDURE — 0 DIATRIZOATE MEGLUMINE & SODIUM PER 1 ML: Performed by: NURSE PRACTITIONER

## 2018-08-29 PROCEDURE — 82565 ASSAY OF CREATININE: CPT

## 2018-08-29 PROCEDURE — 74177 CT ABD & PELVIS W/CONTRAST: CPT

## 2018-08-29 PROCEDURE — 99214 OFFICE O/P EST MOD 30 MIN: CPT | Performed by: NURSE PRACTITIONER

## 2018-08-29 RX ORDER — METRONIDAZOLE 500 MG/1
500 TABLET ORAL 3 TIMES DAILY
Qty: 30 TABLET | Refills: 0 | Status: SHIPPED | OUTPATIENT
Start: 2018-08-29 | End: 2018-10-22 | Stop reason: HOSPADM

## 2018-08-29 RX ORDER — CIPROFLOXACIN 500 MG/1
500 TABLET, FILM COATED ORAL EVERY 12 HOURS SCHEDULED
Qty: 20 TABLET | Refills: 0 | Status: SHIPPED | OUTPATIENT
Start: 2018-08-29 | End: 2018-09-18

## 2018-08-29 RX ORDER — ONDANSETRON 4 MG/1
4 TABLET, FILM COATED ORAL EVERY 8 HOURS PRN
Qty: 20 TABLET | Refills: 0 | Status: SHIPPED | OUTPATIENT
Start: 2018-08-29 | End: 2018-11-15

## 2018-08-29 RX ADMIN — IOPAMIDOL 85 ML: 612 INJECTION, SOLUTION INTRAVENOUS at 16:00

## 2018-08-29 RX ADMIN — DIATRIZOATE MEGLUMINE AND DIATRIZOATE SODIUM 30 ML: 660; 100 LIQUID ORAL; RECTAL at 15:00

## 2018-08-29 NOTE — PROGRESS NOTES
Seth Montgomery is a 63 y.o. male. Pt is here for L low abdominal pain. He says on pain 5 days, mainly in the morning. Pain is light, he can feel it when walking and movements. He had during this past two weeks a few abnormal bowel movements that alternate diarrhea and constipation. Denies fever. Abdominal Pain: Patient complains of abdominal pain. The pain is described as colicky and cramping, and is 5/10 in intensity. Pain is located in the LLQ without radiation. Onset was a few days ago. Symptoms have been unchanged since. Aggravating factors: none.  Alleviating factors: NSAIDs. Associated symptoms: diarrhea. The patient denies anorexia, dysuria, fever, flatus and melena. Last colonoscopy 3 years ago. New patient to me - pt of Dr Mendoza - pt has not had an exacerbation in 2-3 years.   Pt is following with Dr Hermes BRUNO and has communicated with them.  Diverticulitis: Patient complains of diffuse abdominal pain.  The pain is described as colicky, and is 5/10 in intensity. Onset was 5 days ago. Symptoms have been unchanged since. Aggravating factors: eating and spicy foods.  Alleviating factors: NSAIDs and sitting up. Associated symptoms: diarrhea and nausea. The patient denies anorexia, chills, constipation, melena, night sweats and vomiting.    Advised to go to ER with worsening pain or rectal bleeding  Assessment/Plan   Problem List Items Addressed This Visit     None      Visit Diagnoses     Left lower quadrant pain    -  Primary    Relevant Medications    metroNIDAZOLE (FLAGYL) 500 MG tablet    ciprofloxacin (CIPRO) 500 MG tablet    ondansetron (ZOFRAN) 4 MG tablet    Other Relevant Orders    CT Abdomen Pelvis With & Without Contrast    CT Abdomen Pelvis With Contrast    Diverticulitis of intestine, unspecified bleeding status, unspecified complication status, unspecified part of intestinal tract        Relevant Medications    metroNIDAZOLE (FLAGYL) 500 MG tablet    ciprofloxacin (CIPRO) 500 MG tablet     ondansetron (ZOFRAN) 4 MG tablet    Other Relevant Orders    CT Abdomen Pelvis With & Without Contrast    CT Abdomen Pelvis With Contrast             Return for Recheck.  Patient Instructions   Diverticulitis  Diverticulitis is infection or inflammation of small pouches (diverticula) in the colon that form due to a condition called diverticulosis. Diverticula can trap stool (feces) and bacteria, causing infection and inflammation.  Diverticulitis may cause severe stomach pain and diarrhea. It may lead to tissue damage in the colon that causes bleeding. The diverticula may also burst (rupture) and cause infected stool to enter other areas of the abdomen.  Complications of diverticulitis can include:  · Bleeding.  · Severe infection.  · Severe pain.  · Rupture (perforation) of the colon.  · Blockage (obstruction) of the colon.    What are the causes?  This condition is caused by stool becoming trapped in the diverticula, which allows bacteria to grow in the diverticula. This leads to inflammation and infection.  What increases the risk?  You are more likely to develop this condition if:  · You have diverticulosis. The risk for diverticulosis increases if:  ? You are overweight or obese.  ? You use tobacco products.  ? You do not get enough exercise.  · You eat a diet that does not include enough fiber. High-fiber foods include fruits, vegetables, beans, nuts, and whole grains.    What are the signs or symptoms?  Symptoms of this condition may include:  · Pain and tenderness in the abdomen. The pain is normally located on the left side of the abdomen, but it may occur in other areas.  · Fever and chills.  · Bloating.  · Cramping.  · Nausea.  · Vomiting.  · Changes in bowel routines.  · Blood in your stool.    How is this diagnosed?  This condition is diagnosed based on:  · Your medical history.  · A physical exam.  · Tests to make sure there is nothing else causing your condition. These tests may include:  ? Blood  tests.  ? Urine tests.  ? Imaging tests of the abdomen, including X-rays, ultrasounds, MRIs, or CT scans.    How is this treated?  Most cases of this condition are mild and can be treated at home. Treatment may include:  · Taking over-the-counter pain medicines.  · Following a clear liquid diet.  · Taking antibiotic medicines by mouth.  · Rest.    More severe cases may need to be treated at a hospital. Treatment may include:  · Not eating or drinking.  · Taking prescription pain medicine.  · Receiving antibiotic medicines through an IV tube.  · Receiving fluids and nutrition through an IV tube.  · Surgery.    When your condition is under control, your health care provider may recommend that you have a colonoscopy. This is an exam to look at the entire large intestine. During the exam, a lubricated, bendable tube is inserted into the anus and then passed into the rectum, colon, and other parts of the large intestine. A colonoscopy can show how severe your diverticula are and whether something else may be causing your symptoms.  Follow these instructions at home:  Medicines  · Take over-the-counter and prescription medicines only as told by your health care provider. These include fiber supplements, probiotics, and stool softeners.  · If you were prescribed an antibiotic medicine, take it as told by your health care provider. Do not stop taking the antibiotic even if you start to feel better.  · Do not drive or use heavy machinery while taking prescription pain medicine.  General instructions  · Follow a full liquid diet or another diet as directed by your health care provider. After your symptoms improve, your health care provider may tell you to change your diet. He or she may recommend that you eat a diet that contains at least 25 g (25 grams) of fiber daily. Fiber makes it easier to pass stool. Healthy sources of fiber include:  ? Berries. One cup contains 4-8 grams of fiber.  ? Beans or lentils. One half cup  contains 5-8 grams of fiber.  ? Green vegetables. One cup contains 4 grams of fiber.  · Exercise for at least 30 minutes, 3 times each week. You should exercise hard enough to raise your heart rate and break a sweat.  · Keep all follow-up visits as told by your health care provider. This is important. You may need a colonoscopy.  Contact a health care provider if:  · Your pain does not improve.  · You have a hard time drinking or eating food.  · Your bowel movements do not return to normal.  Get help right away if:  · Your pain gets worse.  · Your symptoms do not get better with treatment.  · Your symptoms suddenly get worse.  · You have a fever.  · You vomit more than one time.  · You have stools that are bloody, black, or tarry.  Summary  · Diverticulitis is infection or inflammation of small pouches (diverticula) in the colon that form due to a condition called diverticulosis. Diverticula can trap stool (feces) and bacteria, causing infection and inflammation.  · You are at higher risk for this condition if you have diverticulosis and you eat a diet that does not include enough fiber.  · Most cases of this condition are mild and can be treated at home. More severe cases may need to be treated at a hospital.  · When your condition is under control, your health care provider may recommend that you have an exam called a colonoscopy. This exam can show how severe your diverticula are and whether something else may be causing your symptoms.  This information is not intended to replace advice given to you by your health care provider. Make sure you discuss any questions you have with your health care provider.  Document Released: 09/27/2006 Document Revised: 01/20/2018 Document Reviewed: 01/20/2018  Elsevier Interactive Patient Education © 2018 [employer]        Chief Complaint   Patient presents with   • Abdominal Pain     Social History   Substance Use Topics   • Smoking status: Current Every Day Smoker      "Packs/day: 0.50     Years: 5.50     Start date: 7/8/2015   • Smokeless tobacco: Never Used      Comment: intermittently smokes, up to 1 PPD   • Alcohol use 8.4 oz/week     14 Glasses of wine per week      Comment: 2 glasses wine (1/2 bottle) per day       History of Present Illness     The following portions of the patient's history were reviewed and updated as appropriate:PMHroutine: Social history , Allergies, Current Medications, Active Problem List and Health Maintenance    Review of Systems   Gastrointestinal: Positive for abdominal pain, diarrhea and nausea. Negative for abdominal distention, anal bleeding, blood in stool, constipation, rectal pain and vomiting.       Objective   Vitals:    08/29/18 1340   BP: 128/80   BP Location: Left arm   Pulse: 80   Resp: 16   SpO2: 98%   Weight: 83.9 kg (185 lb)   Height: 171.5 cm (67.5\")     Body mass index is 28.55 kg/m².  Physical Exam   Constitutional: He appears well-developed and well-nourished.   HENT:   Head: Normocephalic and atraumatic.   Eyes: Pupils are equal, round, and reactive to light. EOM are normal.   Neck: Normal range of motion. Neck supple.   Cardiovascular: Normal rate.    Pulmonary/Chest: Effort normal.   Abdominal: Soft. Bowel sounds are normal. He exhibits no distension and no mass. There is tenderness. There is no rebound and no guarding. No hernia.   Musculoskeletal: Normal range of motion.   Neurological: He is alert.   Skin: Skin is warm.   Psychiatric: He has a normal mood and affect. His behavior is normal. Judgment and thought content normal.   Nursing note and vitals reviewed.    Reviewed Data:  No visits with results within 1 Month(s) from this visit.   Latest known visit with results is:   Lab on 06/25/2018   Component Date Value Ref Range Status   • WBC 06/25/2018 3.79* 4.50 - 10.70 10*3/mm3 Final   • RBC 06/25/2018 4.33* 4.60 - 6.00 10*6/mm3 Final   • Hemoglobin 06/25/2018 14.4  13.7 - 17.6 g/dL Final   • Hematocrit 06/25/2018 43.0  " 40.4 - 52.2 % Final   • MCV 06/25/2018 99.3* 79.8 - 96.2 fL Final   • MCH 06/25/2018 33.3* 27.0 - 32.7 pg Final   • MCHC 06/25/2018 33.5  32.6 - 36.4 g/dL Final   • RDW 06/25/2018 12.8  11.5 - 14.5 % Final   • Platelets 06/25/2018 194  140 - 500 10*3/mm3 Final   • Neutrophil Rel % 06/25/2018 44.8  42.7 - 76.0 % Final   • Lymphocyte Rel % 06/25/2018 42.5  19.6 - 45.3 % Final   • Monocyte Rel % 06/25/2018 9.0  5.0 - 12.0 % Final   • Eosinophil Rel % 06/25/2018 2.9  0.3 - 6.2 % Final   • Basophil Rel % 06/25/2018 0.8  0.0 - 1.5 % Final   • Neutrophils Absolute 06/25/2018 1.70* 1.90 - 8.10 10*3/mm3 Final   • Lymphocytes Absolute 06/25/2018 1.61  0.90 - 4.80 10*3/mm3 Final   • Monocytes Absolute 06/25/2018 0.34  0.20 - 1.20 10*3/mm3 Final   • Eosinophils Absolute 06/25/2018 0.11  0.00 - 0.70 10*3/mm3 Final   • Basophils Absolute 06/25/2018 0.03  0.00 - 0.20 10*3/mm3 Final   • Immature Granulocyte Rel % 06/25/2018 0.0  0.0 - 0.5 % Final   • Immature Grans Absolute 06/25/2018 0.00  0.00 - 0.03 10*3/mm3 Final   • Glucose 06/25/2018 93  65 - 99 mg/dL Final   • BUN 06/25/2018 20  8 - 23 mg/dL Final   • Creatinine 06/25/2018 0.88  0.76 - 1.27 mg/dL Final   • eGFR Non  Am 06/25/2018 88  >60 mL/min/1.73 Final   • eGFR African Am 06/25/2018 106  >60 mL/min/1.73 Final   • BUN/Creatinine Ratio 06/25/2018 22.7  7.0 - 25.0 Final   • Sodium 06/25/2018 141  136 - 145 mmol/L Final   • Potassium 06/25/2018 4.5  3.5 - 5.2 mmol/L Final   • Chloride 06/25/2018 103  98 - 107 mmol/L Final   • Total CO2 06/25/2018 26.7  22.0 - 29.0 mmol/L Final   • Calcium 06/25/2018 10.1  8.6 - 10.5 mg/dL Final   • Total Protein 06/25/2018 6.1  6.0 - 8.5 g/dL Final   • Albumin 06/25/2018 4.10  3.50 - 5.20 g/dL Final   • Globulin 06/25/2018 2.0  gm/dL Final   • A/G Ratio 06/25/2018 2.1  g/dL Final   • Total Bilirubin 06/25/2018 0.7  0.1 - 1.2 mg/dL Final   • Alkaline Phosphatase 06/25/2018 36* 39 - 117 U/L Final   • AST (SGOT) 06/25/2018 16  1 - 40 U/L  Final   • ALT (SGPT) 06/25/2018 15  1 - 41 U/L Final   • Total Cholesterol 06/25/2018 196  0 - 200 mg/dL Final   • Triglycerides 06/25/2018 111  0 - 150 mg/dL Final   • HDL Cholesterol 06/25/2018 65* 40 - 60 mg/dL Final   • VLDL Cholesterol 06/25/2018 22.2  5 - 40 mg/dL Final   • LDL Cholesterol  06/25/2018 109* 0 - 100 mg/dL Final   • LDL/HDL Ratio 06/25/2018 1.67   Final

## 2018-08-29 NOTE — TELEPHONE ENCOUNTER
Called pt back. Pt states he thinks he is possibly having a tics flare. He states about a week a half ago, he had some diarrhea and some slight rectal bleeding that was bright red (thinks this was from his hemorrhoids). Then he had some constipation for a few days. Now he is having some lower abdominal pain that started a few days ago. His bowels have been normal since the pain started; he has not seen any bleeding or mucous in the stool since the pain started. The pain is aggravated by gas, it is not constant; he took some Advil a little bit ago and it has all but resolved right now for the time being. Pt denies fever and chills; he checked his temp and it was normal. Denies vomiting, but has had some nausea that comes and goes over the last week. Advised that I see he has an appt with his PMD today at 1:30 PM so that may be the best way to get treatment, but will forward an update to Dr Mirza and his NP, Teresa. Pt verb understanding and asks that he be called back on his cell number: [phone]

## 2018-08-29 NOTE — PATIENT INSTRUCTIONS
Diverticulitis  Diverticulitis is infection or inflammation of small pouches (diverticula) in the colon that form due to a condition called diverticulosis. Diverticula can trap stool (feces) and bacteria, causing infection and inflammation.  Diverticulitis may cause severe stomach pain and diarrhea. It may lead to tissue damage in the colon that causes bleeding. The diverticula may also burst (rupture) and cause infected stool to enter other areas of the abdomen.  Complications of diverticulitis can include:  · Bleeding.  · Severe infection.  · Severe pain.  · Rupture (perforation) of the colon.  · Blockage (obstruction) of the colon.    What are the causes?  This condition is caused by stool becoming trapped in the diverticula, which allows bacteria to grow in the diverticula. This leads to inflammation and infection.  What increases the risk?  You are more likely to develop this condition if:  · You have diverticulosis. The risk for diverticulosis increases if:  ? You are overweight or obese.  ? You use tobacco products.  ? You do not get enough exercise.  · You eat a diet that does not include enough fiber. High-fiber foods include fruits, vegetables, beans, nuts, and whole grains.    What are the signs or symptoms?  Symptoms of this condition may include:  · Pain and tenderness in the abdomen. The pain is normally located on the left side of the abdomen, but it may occur in other areas.  · Fever and chills.  · Bloating.  · Cramping.  · Nausea.  · Vomiting.  · Changes in bowel routines.  · Blood in your stool.    How is this diagnosed?  This condition is diagnosed based on:  · Your medical history.  · A physical exam.  · Tests to make sure there is nothing else causing your condition. These tests may include:  ? Blood tests.  ? Urine tests.  ? Imaging tests of the abdomen, including X-rays, ultrasounds, MRIs, or CT scans.    How is this treated?  Most cases of this condition are mild and can be treated at home.  Treatment may include:  · Taking over-the-counter pain medicines.  · Following a clear liquid diet.  · Taking antibiotic medicines by mouth.  · Rest.    More severe cases may need to be treated at a hospital. Treatment may include:  · Not eating or drinking.  · Taking prescription pain medicine.  · Receiving antibiotic medicines through an IV tube.  · Receiving fluids and nutrition through an IV tube.  · Surgery.    When your condition is under control, your health care provider may recommend that you have a colonoscopy. This is an exam to look at the entire large intestine. During the exam, a lubricated, bendable tube is inserted into the anus and then passed into the rectum, colon, and other parts of the large intestine. A colonoscopy can show how severe your diverticula are and whether something else may be causing your symptoms.  Follow these instructions at home:  Medicines  · Take over-the-counter and prescription medicines only as told by your health care provider. These include fiber supplements, probiotics, and stool softeners.  · If you were prescribed an antibiotic medicine, take it as told by your health care provider. Do not stop taking the antibiotic even if you start to feel better.  · Do not drive or use heavy machinery while taking prescription pain medicine.  General instructions  · Follow a full liquid diet or another diet as directed by your health care provider. After your symptoms improve, your health care provider may tell you to change your diet. He or she may recommend that you eat a diet that contains at least 25 g (25 grams) of fiber daily. Fiber makes it easier to pass stool. Healthy sources of fiber include:  ? Berries. One cup contains 4-8 grams of fiber.  ? Beans or lentils. One half cup contains 5-8 grams of fiber.  ? Green vegetables. One cup contains 4 grams of fiber.  · Exercise for at least 30 minutes, 3 times each week. You should exercise hard enough to raise your heart rate and  break a sweat.  · Keep all follow-up visits as told by your health care provider. This is important. You may need a colonoscopy.  Contact a health care provider if:  · Your pain does not improve.  · You have a hard time drinking or eating food.  · Your bowel movements do not return to normal.  Get help right away if:  · Your pain gets worse.  · Your symptoms do not get better with treatment.  · Your symptoms suddenly get worse.  · You have a fever.  · You vomit more than one time.  · You have stools that are bloody, black, or tarry.  Summary  · Diverticulitis is infection or inflammation of small pouches (diverticula) in the colon that form due to a condition called diverticulosis. Diverticula can trap stool (feces) and bacteria, causing infection and inflammation.  · You are at higher risk for this condition if you have diverticulosis and you eat a diet that does not include enough fiber.  · Most cases of this condition are mild and can be treated at home. More severe cases may need to be treated at a hospital.  · When your condition is under control, your health care provider may recommend that you have an exam called a colonoscopy. This exam can show how severe your diverticula are and whether something else may be causing your symptoms.  This information is not intended to replace advice given to you by your health care provider. Make sure you discuss any questions you have with your health care provider.  Document Released: 09/27/2006 Document Revised: 01/20/2018 Document Reviewed: 01/20/2018  [employer] Interactive Patient Education © 2018 [employer]

## 2018-08-29 NOTE — TELEPHONE ENCOUNTER
----- Message from Bonifacio Babcock sent at 8/29/2018  9:06 AM EDT -----  Regarding: flare up   Contact: [phone]  Pt called stated having an a diverticulitis flare up and asking for med       [facility] on [address]

## 2018-08-30 ENCOUNTER — TELEPHONE (OUTPATIENT)
Dept: GASTROENTEROLOGY | Facility: CLINIC | Age: 63
End: 2018-08-30

## 2018-08-30 ENCOUNTER — DOCUMENTATION (OUTPATIENT)
Dept: FAMILY MEDICINE CLINIC | Facility: CLINIC | Age: 63
End: 2018-08-30

## 2018-08-30 DIAGNOSIS — K57.92 DIVERTICULITIS OF INTESTINE WITHOUT BLEEDING, UNSPECIFIED COMPLICATION STATUS, UNSPECIFIED PART OF INTESTINAL TRACT: Primary | ICD-10-CM

## 2018-08-30 NOTE — PROGRESS NOTES
Pt sent to [facility] for CT abdomen. Results were called to me at 1614. Pt has acute diverticulitis. I called the patient to explain results. Pt had antibiotics, zofran and discussed liquid diet. Pt advised to go to the ER with worsening pain, fever, rectal bleeding. Close follow up advised and I left a message with Dr Mirza's nurse , Dyana.       IMPRESSION:     Sigmoid acute diverticulitis, with wall thickening, follow-up suggested.     Discussed by telephone with Dorota James at 1611, 8/29/2018.     This report was finalized on 8/29/2018 4:14 PM by Dr. Ross Bauer M.D.

## 2018-09-04 NOTE — TELEPHONE ENCOUNTER
Patient called, advised as per Dr. Mirza's note. Patient verb understanding and is in agreement with the plan. Colonoscopy order placed.

## 2018-09-04 NOTE — TELEPHONE ENCOUNTER
Notes recorded by Fredi Mirza MD on 9/3/2018 at 3:01 PM EDT  Pt almost 5 years since last colon, plan for colonoscopy in 8-12 weeks to allow infection to heal first.

## 2018-09-18 DIAGNOSIS — L23.7 POISON IVY DERMATITIS: Primary | ICD-10-CM

## 2018-09-18 RX ORDER — PREDNISONE 10 MG/1
10 TABLET ORAL SEE ADMIN INSTRUCTIONS
Qty: 41 TABLET | Refills: 0 | Status: SHIPPED | OUTPATIENT
Start: 2018-09-18 | End: 2018-10-22 | Stop reason: HOSPADM

## 2018-10-02 PROBLEM — K57.92 DIVERTICULITIS OF INTESTINE WITHOUT BLEEDING: Status: ACTIVE | Noted: 2018-10-02

## 2018-10-22 ENCOUNTER — ANESTHESIA EVENT (OUTPATIENT)
Dept: GASTROENTEROLOGY | Facility: HOSPITAL | Age: 63
End: 2018-10-22

## 2018-10-22 ENCOUNTER — ANESTHESIA (OUTPATIENT)
Dept: GASTROENTEROLOGY | Facility: HOSPITAL | Age: 63
End: 2018-10-22

## 2018-10-22 ENCOUNTER — HOSPITAL ENCOUNTER (OUTPATIENT)
Facility: HOSPITAL | Age: 63
Setting detail: HOSPITAL OUTPATIENT SURGERY
Discharge: HOME | End: 2018-10-22
Attending: INTERNAL MEDICINE | Admitting: INTERNAL MEDICINE

## 2018-10-22 VITALS
RESPIRATION RATE: 16 BRPM | BODY MASS INDEX: 27.97 KG/M2 | WEIGHT: 178.2 LBS | HEART RATE: 56 BPM | HEIGHT: 67 IN | SYSTOLIC BLOOD PRESSURE: 114 MMHG | TEMPERATURE: 97.8 F | OXYGEN SATURATION: 98 % | DIASTOLIC BLOOD PRESSURE: 81 MMHG

## 2018-10-22 DIAGNOSIS — K57.32 DIVERTICULITIS OF LARGE INTESTINE WITHOUT PERFORATION OR ABSCESS WITHOUT BLEEDING: Primary | ICD-10-CM

## 2018-10-22 DIAGNOSIS — K57.92 DIVERTICULITIS OF INTESTINE WITHOUT BLEEDING, UNSPECIFIED COMPLICATION STATUS, UNSPECIFIED PART OF INTESTINAL TRACT: ICD-10-CM

## 2018-10-22 PROCEDURE — S0260 H&P FOR SURGERY: HCPCS | Performed by: INTERNAL MEDICINE

## 2018-10-22 PROCEDURE — 88305 TISSUE EXAM BY PATHOLOGIST: CPT | Performed by: INTERNAL MEDICINE

## 2018-10-22 PROCEDURE — 25010000002 PROPOFOL 10 MG/ML EMULSION: Performed by: ANESTHESIOLOGY

## 2018-10-22 PROCEDURE — 45380 COLONOSCOPY AND BIOPSY: CPT | Performed by: INTERNAL MEDICINE

## 2018-10-22 RX ORDER — PROPOFOL 10 MG/ML
VIAL (ML) INTRAVENOUS AS NEEDED
Status: DISCONTINUED | OUTPATIENT
Start: 2018-10-22 | End: 2018-10-22 | Stop reason: SURG

## 2018-10-22 RX ORDER — PROPOFOL 10 MG/ML
VIAL (ML) INTRAVENOUS CONTINUOUS PRN
Status: DISCONTINUED | OUTPATIENT
Start: 2018-10-22 | End: 2018-10-22 | Stop reason: SURG

## 2018-10-22 RX ORDER — SODIUM CHLORIDE, SODIUM LACTATE, POTASSIUM CHLORIDE, CALCIUM CHLORIDE 600; 310; 30; 20 MG/100ML; MG/100ML; MG/100ML; MG/100ML
1000 INJECTION, SOLUTION INTRAVENOUS CONTINUOUS
Status: DISCONTINUED | OUTPATIENT
Start: 2018-10-22 | End: 2018-10-22 | Stop reason: HOSPADM

## 2018-10-22 RX ORDER — LIDOCAINE HYDROCHLORIDE 20 MG/ML
INJECTION, SOLUTION INFILTRATION; PERINEURAL AS NEEDED
Status: DISCONTINUED | OUTPATIENT
Start: 2018-10-22 | End: 2018-10-22 | Stop reason: SURG

## 2018-10-22 RX ADMIN — LIDOCAINE HYDROCHLORIDE 50 MG: 20 INJECTION, SOLUTION INFILTRATION; PERINEURAL at 10:41

## 2018-10-22 RX ADMIN — PROPOFOL 140 MCG/KG/MIN: 10 INJECTION, EMULSION INTRAVENOUS at 10:41

## 2018-10-22 RX ADMIN — PROPOFOL 125 MG: 10 INJECTION, EMULSION INTRAVENOUS at 10:41

## 2018-10-22 RX ADMIN — SODIUM CHLORIDE, POTASSIUM CHLORIDE, SODIUM LACTATE AND CALCIUM CHLORIDE 1000 ML: 600; 310; 30; 20 INJECTION, SOLUTION INTRAVENOUS at 09:52

## 2018-10-22 NOTE — H&P
[facility], [facility]  Pre Procedure History & Physical    Chief Complaint:   History colon polyps and diverticulitis    Subjective     HPI:   Patient 63-year-old male with history of hyperlipidemia as well as colon polyps and recent history of diverticulitis.  Patient here for colonoscopy.    Past Medical History:   Past Medical History:   Diagnosis Date   • Anxiety    • Depression    • Diverticula of colon    • Diverticulitis 2005   • Fasciculation    • H/O esophageal reflux    • H/O Fasciculations    • History of foreign travel     [place] and [facility]   • Hyperlipidemia    • Loss of hearing        Past Surgical History:  Past Surgical History:   Procedure Laterality Date   • HERNIA REPAIR         Family History:  Family History   Problem Relation Age of Onset   • Transient ischemic attack Mother    • Breast cancer Mother 75   • Von Willebrand disease Mother    • Hypertension Father    • Heart disease Father    • Von Willebrand disease Sister    • Ovarian cancer Maternal Grandmother 62       Social History:   reports that he has been smoking.  He started smoking about 3 years ago. He has a 2.75 pack-year smoking history. He has never used smokeless tobacco. He reports that he drinks about 8.4 oz of alcohol per week . He reports that he does not use drugs.    Medications:   Prescriptions Prior to Admission   Medication Sig Dispense Refill Last Dose   • Calcium Carbonate Antacid (ANTACID PO) Take  by mouth As Needed.   Past Week at Unknown time   • Ibuprofen (ADVIL PO) Take  by mouth As Needed.   Past Week at Unknown time   • Nicotine Polacrilex (NICORETTE MT) Apply  to the mouth or throat As Needed.   10/22/2018 at Unknown time   • simvastatin (ZOCOR) 40 MG tablet Take 1 tablet by mouth Every Night. 90 tablet 3 Past Week at Unknown time   • hyoscyamine (LEVSIN) 0.125 MG SL tablet DISSOLVE 1 TABLET UNDER THE TONGUE AT NIGHT AS NEEDED FOR PAIN 30 tablet 0 More than a month at Unknown time   •  "metroNIDAZOLE (FLAGYL) 500 MG tablet Take 1 tablet by mouth 3 (Three) Times a Day. 30 tablet 0    • ondansetron (ZOFRAN) 4 MG tablet Take 1 tablet by mouth Every 8 (Eight) Hours As Needed for Nausea or Vomiting. 20 tablet 0 More than a month at Unknown time   • predniSONE (DELTASONE) 10 MG tablet Take 1 tablet by mouth See Admin Instructions. 3 bid for 3 days, 2 bid for 2 days, 3 qd for 3 days, 2 qd for 2d, 1 qd for 2 d 41 tablet 0    • sildenafil (REVATIO) 20 MG tablet As directed 30 tablet 11 10/18/2018       Allergies:  Patient has no known allergies.    ROS:    Pertinent items are noted in HPI     Objective     Blood pressure 128/81, pulse 67, temperature 97.8 °F (36.6 °C), temperature source Oral, resp. rate 10, height 170.2 cm (67\"), weight 80.8 kg (178 lb 3.2 oz), SpO2 98 %.    Physical Exam   Constitutional: Pt is oriented to person, place, and time and well-developed, well-nourished, and in no distress.   Mouth/Throat: Oropharynx is clear and moist.   Neck: Normal range of motion.   Cardiovascular: Normal rate, regular rhythm and normal heart sounds.    Pulmonary/Chest: Effort normal and breath sounds normal.   Abdominal: Soft. Nontender  Skin: Skin is warm and dry.   Psychiatric: Mood, memory, affect and judgment normal.     Assessment/Plan     Diagnosis:  History colon polyps  Recent history of diverticulitis    Anticipated Surgical Procedure:  Colonoscopy    The risks, benefits, and alternatives of this procedure have been discussed with the patient or the responsible party- the patient understands and agrees to proceed.                                                          "

## 2018-10-22 NOTE — ANESTHESIA PREPROCEDURE EVALUATION
Anesthesia Evaluation     Patient summary reviewed                Airway   Mallampati: III  TM distance: >3 FB  Neck ROM: full  No difficulty expected  Dental - normal exam     Pulmonary    Cardiovascular   Exercise tolerance: good (4-7 METS)    Rhythm: regular  Rate: normal        Neuro/Psych  GI/Hepatic/Renal/Endo      Musculoskeletal     Abdominal    Substance History      OB/GYN          Other                        Anesthesia Plan    ASA 1     MAC     intravenous induction   Anesthetic plan, all risks, benefits, and alternatives have been provided, discussed and informed consent has been obtained with: patient.

## 2018-10-22 NOTE — ANESTHESIA POSTPROCEDURE EVALUATION
Patient: Seth Montgomery    Procedure Summary     Date:  10/22/18 Room / Location:   [facility]    Anesthesia Start:  1034 Anesthesia Stop:  1103    Procedure:  COLONOSCOPY to cecum and TI:  cold polypectomies (N/A ) Diagnosis:       Diverticulitis of intestine without bleeding, unspecified complication status, unspecified part of intestinal tract      Colon polyps      Diverticulosis      Internal hemorrhoids without complication      (Diverticulitis of intestine without bleeding, unspecified complication status, unspecified part of intestinal tract [K57.92])    Surgeon:  Fredi Mirza MD Provider:  Lester Becker MD    Anesthesia Type:  MAC ASA Status:  1          Anesthesia Type: MAC  Last vitals  BP   128/81 (10/22/18 0932)   Temp   36.6 °C (97.8 °F) (10/22/18 0932)   Pulse   67 (10/22/18 0932)   Resp   10 (10/22/18 0932)     SpO2   98 % (10/22/18 0932)     Post Anesthesia Care and Evaluation    Patient location during evaluation: bedside  Patient participation: complete - patient participated  Level of consciousness: awake and alert  Pain score: 0  Pain management: adequate  Airway patency: patent  Anesthetic complications: No anesthetic complications  PONV Status: none  Cardiovascular status: acceptable  Respiratory status: acceptable  Hydration status: acceptable  Post Neuraxial Block status: Motor and sensory function returned to baseline

## 2018-10-23 LAB
CYTO UR: NORMAL
LAB AP CASE REPORT: NORMAL
PATH REPORT.FINAL DX SPEC: NORMAL
PATH REPORT.GROSS SPEC: NORMAL

## 2018-11-07 ENCOUNTER — TELEPHONE (OUTPATIENT)
Dept: GASTROENTEROLOGY | Facility: CLINIC | Age: 63
End: 2018-11-07

## 2018-11-07 NOTE — TELEPHONE ENCOUNTER
Patient called, advised as per Dr. Mirza's note. He verb understanding and is agreeable to the plan. Patient's health maintenance record updated to reflect the need to repeat colonoscopy in 5 years.

## 2018-11-07 NOTE — TELEPHONE ENCOUNTER
----- Message from Fredi Mirza MD sent at 11/4/2018 11:12 AM EST -----  Benign polyp, repeat colon 5 years as discussed

## 2018-11-15 ENCOUNTER — OFFICE VISIT (OUTPATIENT)
Dept: SURGERY | Facility: CLINIC | Age: 63
End: 2018-11-15

## 2018-11-15 VITALS — WEIGHT: 180 LBS | BODY MASS INDEX: 27.28 KG/M2 | HEART RATE: 80 BPM | OXYGEN SATURATION: 98 % | HEIGHT: 68 IN

## 2018-11-15 DIAGNOSIS — K57.92 DIVERTICULITIS: Primary | ICD-10-CM

## 2018-11-15 PROCEDURE — 99244 OFF/OP CNSLTJ NEW/EST MOD 40: CPT | Performed by: SURGERY

## 2018-11-15 RX ORDER — CEPHALEXIN 500 MG/1
500 CAPSULE ORAL 3 TIMES DAILY
Qty: 30 CAPSULE | Refills: 0 | Status: SHIPPED | OUTPATIENT
Start: 2018-11-15 | End: 2018-11-25

## 2018-11-15 RX ORDER — METRONIDAZOLE 500 MG/1
500 TABLET ORAL 3 TIMES DAILY
Qty: 30 TABLET | Refills: 0 | Status: SHIPPED | OUTPATIENT
Start: 2018-11-15 | End: 2018-11-25

## 2018-11-15 NOTE — PROGRESS NOTES
SUMMARY (A/P):    63-year-old gentleman with chronic recurrent sigmoid diverticulitis.  He's had approximately 4 episodes in the last 10 years.  He has the option of surgical resection in the form of laparoscopic sigmoid colectomy versus continued expectant observation.  I discussed the pros and cons of each approach and described in detail the nature of the surgery and expected recovery.  At this point he is going to hold off on scheduling surgery and let me know if he has recurrent episodes.  I also gave him a prescription for Keflex and Flagyl to fill and carry with him, particularly when he travels outside of the country to start taking as soon as symptoms of an episode are coming on.      CC:    Diverticulitis, referred for consultation by Dr. Mirza    HPI:    63-year-old gentleman with 10 year history of chronic recurrent diverticulitis.  Each episode consists of basically moderate to severe left lower quadrant pain.  All episodes have resolved with antibiotics.  To the episodes required hospitalization.  He denies any associated symptoms such as fever, chills, pneumaturia, nausea, vomiting, bowel habit changes.    PSH:    Colonoscopy 10/22/2018  Open right inguinal hernia as child    PMH:    Diverticulitis  Anxiety/depression  Gastroesophageal reflux disease  Hyperlipidemia    FAMILY HISTORY:    Negative for colorectal cancer    SOCIAL HISTORY:   Half pack per day tobacco use  Occasional alcohol use    ALLERGIES: reviewed, in Epic    MEDICATIONS: reviewed, in Epic    ROS:  No chest pain or shortness of air.  All other systems reviewed and negative other than presenting complaints.    RADIOLOGY/ENDOSCOPY:    -CT abdomen pelvis 8/29/2018 demonstrated sigmoid acute diverticulitis with wall thickening.  I reviewed the images and concur.  No complicating features are noted.    PHYSICAL EXAM:   Constitutional: Well-developed well-nourished, no acute distress  Vital signs: Heart rate 80, weight 180 pounds, height  68 inches, BMI 27  Eyes: Conjunctiva normal, sclera nonicteric  ENMT: Hearing grossly normal, oral mucosa moist  Neck: Supple, no palpable mass, normal thyroid, trachea midline  Respiratory: Clear to auscultation, normal inspiratory effort  Cardiovascular: Regular rate, no murmur, no carotid bruit, no peripheral edema, no jugular venous distention  Gastrointestinal: Soft, nontender, no palpable mass, no hepatosplenomegaly, negative for hernia, bowel sounds normal  Lymphatics (palpable nodes):  cervical-negative, axillary-negative  Skin:  Warm, dry, no rash on visualized skin surfaces  Musculoskeletal: Symmetric strength, normal gait  Psychiatric: Alert and oriented ×3, normal affect     BASILIA RENE M.D.

## 2018-12-10 ENCOUNTER — LAB (OUTPATIENT)
Dept: LAB | Facility: HOSPITAL | Age: 63
End: 2018-12-10

## 2018-12-10 ENCOUNTER — OFFICE VISIT (OUTPATIENT)
Dept: ONCOLOGY | Facility: CLINIC | Age: 63
End: 2018-12-10

## 2018-12-10 VITALS
BODY MASS INDEX: 26.96 KG/M2 | WEIGHT: 182 LBS | RESPIRATION RATE: 16 BRPM | SYSTOLIC BLOOD PRESSURE: 125 MMHG | HEART RATE: 62 BPM | OXYGEN SATURATION: 99 % | TEMPERATURE: 98.4 F | DIASTOLIC BLOOD PRESSURE: 81 MMHG | HEIGHT: 69 IN

## 2018-12-10 DIAGNOSIS — D72.818 OTHER DECREASED WHITE BLOOD CELL (WBC) COUNT: Primary | ICD-10-CM

## 2018-12-10 DIAGNOSIS — E78.1 PURE HYPERGLYCERIDEMIA: Primary | ICD-10-CM

## 2018-12-10 LAB
BASOPHILS # BLD AUTO: 0.04 10*3/MM3 (ref 0–0.1)
BASOPHILS NFR BLD AUTO: 1 % (ref 0–1.1)
DEPRECATED RDW RBC AUTO: 42.4 FL (ref 37–49)
EOSINOPHIL # BLD AUTO: 0.08 10*3/MM3 (ref 0–0.36)
EOSINOPHIL NFR BLD AUTO: 1.9 % (ref 1–5)
ERYTHROCYTE [DISTWIDTH] IN BLOOD BY AUTOMATED COUNT: 12.1 % (ref 11.7–14.5)
HCT VFR BLD AUTO: 41.8 % (ref 40–49)
HGB BLD-MCNC: 14.5 G/DL (ref 13.5–16.5)
IMM GRANULOCYTES # BLD: 0.02 10*3/MM3 (ref 0–0.03)
IMM GRANULOCYTES NFR BLD: 0.5 % (ref 0–0.5)
LYMPHOCYTES # BLD AUTO: 1.62 10*3/MM3 (ref 1–3.5)
LYMPHOCYTES NFR BLD AUTO: 39.2 % (ref 20–49)
MCH RBC QN AUTO: 32.7 PG (ref 27–33)
MCHC RBC AUTO-ENTMCNC: 34.7 G/DL (ref 32–35)
MCV RBC AUTO: 94.4 FL (ref 83–97)
MONOCYTES # BLD AUTO: 0.35 10*3/MM3 (ref 0.25–0.8)
MONOCYTES NFR BLD AUTO: 8.5 % (ref 4–12)
NEUTROPHILS # BLD AUTO: 2.02 10*3/MM3 (ref 1.5–7)
NEUTROPHILS NFR BLD AUTO: 48.9 % (ref 39–75)
NRBC BLD MANUAL-RTO: 0 /100 WBC (ref 0–0)
PLATELET # BLD AUTO: 189 10*3/MM3 (ref 150–375)
PMV BLD AUTO: 10.1 FL (ref 8.9–12.1)
RBC # BLD AUTO: 4.43 10*6/MM3 (ref 4.3–5.5)
WBC NRBC COR # BLD: 4.13 10*3/MM3 (ref 4–10)

## 2018-12-10 PROCEDURE — 85025 COMPLETE CBC W/AUTO DIFF WBC: CPT | Performed by: INTERNAL MEDICINE

## 2018-12-10 PROCEDURE — 99214 OFFICE O/P EST MOD 30 MIN: CPT | Performed by: INTERNAL MEDICINE

## 2018-12-10 PROCEDURE — 36416 COLLJ CAPILLARY BLOOD SPEC: CPT | Performed by: INTERNAL MEDICINE

## 2018-12-10 NOTE — PROGRESS NOTES
Subjective .     REASON FOR FOLLOWUP :   Leukocytopenia    HISTORY OF PRESENT ILLNESS:  The patient is a 63 y.o. year old male  who is here for follow-up with the above-mentioned history.    Denies fever, chills, weight loss, night sweats.  Denies recurrent or unusual infections.    States he had an episode of diverticulitis which was managed successfully as an outpatient.    Drinks alcohol now 5 nights of the week.  Continues to smoke    Past Medical History:   Diagnosis Date   • Anxiety    • Depression    • Diverticula of colon    • Diverticulitis 2005   • Fasciculation    • H/O esophageal reflux    • H/O Fasciculations    • History of foreign travel     [place] [facility]   • Hyperlipidemia    • Loss of hearing      Past Surgical History:   Procedure Laterality Date   • COLONOSCOPY W/ POLYPECTOMY N/A 01/31/2014    One 6 mm polyp in the transverse colon (path: hyperplastic), one 2 mm polyp in the sigmoid colon (path: hyperplastic), diverticulosis in the sigmoid colon, non-bleeding internal hemorrhoids, normal ileum-Dr. Fredi Mirza   • COLONOSCOPY W/ POLYPECTOMY N/A 01/04/2007    Normal terminal ileum, two 18 mm polyps in the mid ascending colon (path: hyperplastic)-Dr. Fredi Mirza   • HERNIA REPAIR     • UPPER GASTROINTESTINAL ENDOSCOPY N/A 08/06/2010    LA Grade A reflux esophagitis, z-line irregular at the GE junction, hiatus hernia, gastric mucosal abnormality characterized by erythema, normal duodenum-Dr. Fredi Mirza       HEMATOLOGIC/ONCOLOGIC HISTORY:  (History from previous dates can be found in the separate document.)    MEDICATIONS    Current Outpatient Medications:   •  Calcium Carbonate Antacid (ANTACID PO), Take 1 tablet by mouth As Needed., Disp: , Rfl:   •  Ibuprofen (ADVIL PO), Take 1 tablet by mouth As Needed., Disp: , Rfl:   •  Nicotine Polacrilex (NICORETTE MT), Apply  to the mouth or throat As Needed., Disp: , Rfl:   •  sildenafil (REVATIO) 20 MG tablet, As directed  (Patient taking differently: Take 20 mg by mouth As Needed. As directed), Disp: 30 tablet, Rfl: 11  •  simvastatin (ZOCOR) 40 MG tablet, Take 1 tablet by mouth Every Night., Disp: 90 tablet, Rfl: 3    ALLERGIES:   No Known Allergies    SOCIAL HISTORY:       Social History     Socioeconomic History   • Marital status:      Spouse name: Not on file   • Number of children: Not on file   • Years of education: College   • Highest education level: Not on file   Social Needs   • Financial resource strain: Not on file   • Food insecurity - worry: Not on file   • Food insecurity - inability: Not on file   • Transportation needs - medical: Not on file   • Transportation needs - non-medical: Not on file   Occupational History   • Occupation:      Comment: [employer]   Tobacco Use   • Smoking status: Current Every Day Smoker     Packs/day: 0.50     Years: 5.50     Pack years: 2.75     Start date: 7/8/2015   • Smokeless tobacco: Never Used   • Tobacco comment: intermittently smokes, up to 1 PPD   Substance and Sexual Activity   • Alcohol use: Yes     Alcohol/week: 6.0 oz     Types: 10 Glasses of wine per week     Comment: 2 glasses wine (1/2 bottle) per day   • Drug use: No   • Sexual activity: Defer   Other Topics Concern   • Not on file   Social History Narrative   • Not on file         FAMILY HISTORY:  Family History   Problem Relation Age of Onset   • Transient ischemic attack Mother    • Breast cancer Mother 75   • Von Willebrand disease Mother    • Hypertension Father    • Heart disease Father    • Von Willebrand disease Sister    • Ovarian cancer Maternal Grandmother 62       REVIEW OF SYSTEMS:  Review of Systems   Constitutional: Negative for activity change.   HENT: Negative for nosebleeds and trouble swallowing.    Respiratory: Negative for shortness of breath and wheezing.    Cardiovascular: Negative for chest pain and palpitations.   Gastrointestinal: Negative for constipation, diarrhea  "and nausea.   Genitourinary: Negative for dysuria and hematuria.   Musculoskeletal: Negative for arthralgias and myalgias.   Neurological: Negative for seizures and syncope.   Hematological: Negative for adenopathy. Does not bruise/bleed easily.   Psychiatric/Behavioral: Negative for confusion.        Objective    Vitals:    12/10/18 1020   BP: 125/81   Pulse: 62   Resp: 16   Temp: 98.4 °F (36.9 °C)   TempSrc: Oral   SpO2: 99%   Weight: 82.6 kg (182 lb)   Height: 174 cm (68.5\")  Comment: new ht   PainSc: 0-No pain     Current Status 12/10/2018   ECOG score 0      PHYSICAL EXAM:    CONSTITUTIONAL:  Vital signs reviewed.  No distress, looks comfortable.  EYES:  Conjunctiva and lids unremarkable.  PERRLA  EARS,NOSE,MOUTH,THROAT:  Ears and nose appear unremarkable.  Lips, teeth, gums appear unremarkable.  RESPIRATORY:  Normal respiratory effort.  Lungs clear to auscultation bilaterally.  CARDIOVASCULAR:  Normal S1, S2.  No murmurs rubs or gallops.  No significant lower extremity edema.  GASTROINTESTINAL: Abdomen appears unremarkable.  Nontender.  No hepatomegaly.  No splenomegaly.  LYMPHATIC:  No cervical, supraclavicular, axillary lymphadenopathy.  SKIN:  Warm.  No rashes.  PSYCHIATRIC:  Normal judgment and insight.  Normal mood and affect.      RECENT LABS:        WBC   Date Value Ref Range Status   12/10/2018 4.13 4.00 - 10.00 10*3/mm3 Final   12/08/2017 3.88 (L) 4.00 - 10.00 10*3/mm3 Final   09/12/2017 5.11 4.00 - 10.00 10*3/mm3 Final   06/23/2017 3.84 (L) 4.00 - 10.00 10*3/mm3 Final   04/27/2017 3.84 (L) 4.50 - 10.70 10*3/mm3 Final   03/07/2017 4.03 (L) 4.50 - 10.70 10*3/mm3 Final   06/13/2016 6.84 4.50 - 10.70 10*3/mm3 Final   01/08/2016 5.1 3.4 - 10.8 x10E3/uL Final     Hemoglobin   Date Value Ref Range Status   12/10/2018 14.5 13.5 - 16.5 g/dL Final   06/25/2018 14.4 13.7 - 17.6 g/dL Final   12/08/2017 14.7 13.5 - 16.5 g/dL Final   09/12/2017 14.4 13.5 - 16.5 g/dL Final   06/23/2017 13.7 13.5 - 16.5 g/dL Final "   04/27/2017 13.8 13.7 - 17.6 g/dL Final   03/07/2017 14.5 13.7 - 17.6 g/dL Final   06/13/2016 13.2 (L) 13.7 - 17.6 g/dL Final   01/08/2016 14.3 12.6 - 17.7 g/dL Final     Platelets   Date Value Ref Range Status   12/10/2018 189 150 - 375 10*3/mm3 Final   06/25/2018 194 140 - 500 10*3/mm3 Final   12/08/2017 201 150 - 375 10*3/mm3 Final   09/12/2017 195 150 - 375 10*3/mm3 Final   06/23/2017 173 150 - 375 10*3/mm3 Final   04/27/2017 183 140 - 500 10*3/mm3 Final   03/07/2017 239 140 - 500 10*3/mm3 Final   06/13/2016 219 140 - 500 10*3/mm3 Final   01/08/2016 222 150 - 379 x10E3/uL Final       Assessment/Plan   Other decreased white blood cell (WBC) count  - CBC & Differential    *Leukocytopenia.  Mild, Intermittent.  WBC 3.8-5.1 since at least April 2017.  Hb and PLT unremarkable.  Differential unremarkable.  B12 and folate unremarkable.  WBC normal today.    *Neutropenia.  So far, this is only occurred once, April 2017 with ANC 1810 (normal range beginning in 1900).  ANC normal today.    *Smoking.   Continues to smoke.  I recommended smoking cessation.  Smoking can lead to malignancies and other health problems.    *Regular alcohol use.  Drinks 2 glasses of wine 5 out of 7 nights which is an improvement from a prior 7 out of 7 nights.  Ideally, he should cut this back some as well.  Alcohol use can lead to cytopenias.    Plan  · M.D. CBC 1 year   · (I offered returning here as needed only with Dr. Mendoza monitoring CBC.  However, he prefers to return here, which is fine with me)

## 2019-05-03 DIAGNOSIS — F52.21 ED (ERECTILE DYSFUNCTION) OF NON-ORGANIC ORIGIN: ICD-10-CM

## 2019-05-03 RX ORDER — SILDENAFIL CITRATE 20 MG/1
TABLET ORAL
Qty: 30 TABLET | Refills: 0 | Status: SHIPPED | OUTPATIENT
Start: 2019-05-03 | End: 2019-05-03 | Stop reason: SDUPTHER

## 2019-05-06 RX ORDER — SILDENAFIL CITRATE 20 MG/1
TABLET ORAL
Qty: 90 TABLET | Refills: 0 | Status: SHIPPED | OUTPATIENT
Start: 2019-05-06 | End: 2019-08-14 | Stop reason: SDUPTHER

## 2019-07-19 ENCOUNTER — TELEPHONE (OUTPATIENT)
Dept: FAMILY MEDICINE CLINIC | Facility: CLINIC | Age: 64
End: 2019-07-19

## 2019-08-02 ENCOUNTER — TELEPHONE (OUTPATIENT)
Dept: FAMILY MEDICINE CLINIC | Facility: CLINIC | Age: 64
End: 2019-08-02

## 2019-08-05 ENCOUNTER — TELEPHONE (OUTPATIENT)
Dept: GASTROENTEROLOGY | Facility: CLINIC | Age: 64
End: 2019-08-05

## 2019-08-05 NOTE — TELEPHONE ENCOUNTER
----- Message from Lina Leonard sent at 8/5/2019  1:34 PM EDT -----  Regarding: Travel Meds  Contact: [phone]  Patient travelling out of country TOMORROW and would like to know if we can prescribe emergency meds in case he has a bout of diverticulitis. Please call back today if possible.

## 2019-08-14 DIAGNOSIS — F52.21 ED (ERECTILE DYSFUNCTION) OF NON-ORGANIC ORIGIN: ICD-10-CM

## 2019-08-15 RX ORDER — SILDENAFIL CITRATE 20 MG/1
TABLET ORAL
Qty: 90 TABLET | Refills: 0 | Status: SHIPPED | OUTPATIENT
Start: 2019-08-15 | End: 2019-11-04 | Stop reason: SDUPTHER

## 2019-11-04 ENCOUNTER — OFFICE VISIT (OUTPATIENT)
Dept: FAMILY MEDICINE CLINIC | Facility: CLINIC | Age: 64
End: 2019-11-04

## 2019-11-04 VITALS
RESPIRATION RATE: 16 BRPM | DIASTOLIC BLOOD PRESSURE: 70 MMHG | HEART RATE: 60 BPM | OXYGEN SATURATION: 99 % | SYSTOLIC BLOOD PRESSURE: 128 MMHG | HEIGHT: 68 IN | WEIGHT: 187.3 LBS | BODY MASS INDEX: 28.39 KG/M2

## 2019-11-04 DIAGNOSIS — R06.83 SNORING: ICD-10-CM

## 2019-11-04 DIAGNOSIS — Z00.00 HEALTHCARE MAINTENANCE: Primary | ICD-10-CM

## 2019-11-04 DIAGNOSIS — F52.21 ED (ERECTILE DYSFUNCTION) OF NON-ORGANIC ORIGIN: ICD-10-CM

## 2019-11-04 PROCEDURE — 99396 PREV VISIT EST AGE 40-64: CPT | Performed by: FAMILY MEDICINE

## 2019-11-04 RX ORDER — SILDENAFIL CITRATE 20 MG/1
20 TABLET ORAL SEE ADMIN INSTRUCTIONS
Qty: 90 TABLET | Refills: 0 | Status: SHIPPED | OUTPATIENT
Start: 2019-11-04 | End: 2020-03-17

## 2019-11-04 NOTE — PROGRESS NOTES
"Patient Instructions   ASSESSMENT AND PLAN     Problem List Items Addressed This Visit        Other    ED (erectile dysfunction) of non-organic origin    Relevant Medications    sildenafil (REVATIO) 20 MG tablet      Other Visit Diagnoses     Healthcare maintenance    -  Primary    Relevant Orders    CBC (No Diff)    Comprehensive Metabolic Panel    Lipid Panel With LDL / HDL Ratio    PSA SCREENING    Snoring        Relevant Orders    Ambulatory Referral to Sleep Medicine               Return in about 1 year (around 11/4/2020) for Annual physical.  Patient was given instructions and counseling regarding his condition or for health maintenance advice. Please see specific information pulled into the AVS by me.          SUBJECTIVE  Seth Montgomery is a 64 y.o. male being seen in our office today for Annual Exam               Social History  He  reports that he has quit smoking. He started smoking about 4 years ago. He has a 2.75 pack-year smoking history. He has never used smokeless tobacco. He reports that he drinks about 6.0 oz of alcohol per week. He reports that he does not use drugs. He is still using a little nicotine gum but off the cigs.     History of the Present Illness   HPI  Seth Montgomery 64 y.o. male who presents for yearly preventive exam.  He exercises mostly doing well for cycling..  History; colonoscopy: Last colonoscopy: colonoscopy 1 years ago without abnormalities.  Immunizations: not up to date - shingrix  PSA was discussed and ordered He has no increased risk of prostate cancer  He does see his dentist regularly  His diet is in general, a \"healthy\" diet    He describes his alcohol intake as social drinker  His cardiovascular risk is: LDL goal is under 130  This patient has ever been tested for HepC: yes     He has had several episodes of dyspepsia along with his heartburn (that is mild) but uses Tums for that. Works better than ranitidine or liquid maalox.     Snoring bad enough that it keeps his " bed partner awake (she sleeps somewhere else).   Significant Past History  The following portions of the patient's history were reviewed and updated as appropriate:PMHroutine: Social history , Past Medical History, Surgical history , Allergies, Current Medications, Active Problem List, Family History and Health Maintenance    Review of Systems   Constitutional: Negative for activity change, appetite change, chills, fatigue, fever and unexpected weight change.   HENT: Negative for congestion, ear pain, hearing loss, mouth sores, nosebleeds, rhinorrhea and sore throat.         Allergies   Eyes: Negative for pain and visual disturbance.   Respiratory: Negative for cough, shortness of breath and wheezing.    Cardiovascular: Negative for chest pain, palpitations and leg swelling.   Gastrointestinal: Negative for abdominal distention, abdominal pain, blood in stool, constipation, diarrhea, nausea and vomiting.        Gas. Indigestion, hemorrhoids -- neither prilosec nor ranitidine help   Endocrine: Negative for cold intolerance and heat intolerance.   Genitourinary: Positive for frequency (walking up once a night to urinate, occ twice). Negative for difficulty urinating, discharge, dysuria, hematuria and urgency.        Groin pain on the left   Musculoskeletal: Positive for arthralgias and back pain (primarily on the top of the iliac crest). Negative for joint swelling.   Skin: Negative for rash and wound.   Neurological: Negative for dizziness, weakness, numbness and headaches.   Hematological: Does not bruise/bleed easily.   Psychiatric/Behavioral: Negative for confusion, dysphoric mood, sleep disturbance and suicidal ideas. The patient is not nervous/anxious.         Stress   I have reviewed the ROS as documented by the MA. Yenny Mendoza MD      OBJECTIVE   Vital Signs          BP Readings from Last 1 Encounters:   11/04/19 128/70     Wt Readings from Last 3 Encounters:   11/04/19 85 kg (187 lb 4.8 oz)   09/13/19  81.6 kg (180 lb)   03/09/19 81.2 kg (179 lb)   Body mass index is 28.48 kg/m².     Physical Exam   Constitutional: He is oriented to person, place, and time. He appears well-developed and well-nourished. No distress.   HENT:   Head: Normocephalic and atraumatic.   Right Ear: Tympanic membrane, external ear and ear canal normal.   Left Ear: Tympanic membrane, external ear and ear canal normal.   Mouth/Throat: Oropharynx is clear and moist.   Eyes: Conjunctivae are normal.   Neck: Neck supple. No thyromegaly present.   Cardiovascular: Normal rate, regular rhythm, normal heart sounds and intact distal pulses.   No murmur heard.  Pulmonary/Chest: Effort normal and breath sounds normal. No respiratory distress. He has no wheezes. He has no rales.   Abdominal: Soft. Bowel sounds are normal. He exhibits no distension and no mass. There is no tenderness.   Musculoskeletal: Normal range of motion. He exhibits no edema or deformity.   Lymphadenopathy:     He has no cervical adenopathy.   Neurological: He is alert and oriented to person, place, and time.   Skin: Skin is warm and dry.   Psychiatric: He has a normal mood and affect. His behavior is normal. Judgment and thought content normal.   Vitals reviewed.    Data Reviewed

## 2019-11-05 LAB
ALBUMIN SERPL-MCNC: 4.7 G/DL (ref 3.6–4.8)
ALBUMIN/GLOB SERPL: 2.5 {RATIO} (ref 1.2–2.2)
ALP SERPL-CCNC: 46 IU/L (ref 39–117)
ALT SERPL-CCNC: 15 IU/L (ref 0–44)
AST SERPL-CCNC: 23 IU/L (ref 0–40)
BILIRUB SERPL-MCNC: 0.7 MG/DL (ref 0–1.2)
BUN SERPL-MCNC: 14 MG/DL (ref 8–27)
BUN/CREAT SERPL: 17 (ref 10–24)
CALCIUM SERPL-MCNC: 9.8 MG/DL (ref 8.6–10.2)
CHLORIDE SERPL-SCNC: 106 MMOL/L (ref 96–106)
CHOLEST SERPL-MCNC: 235 MG/DL (ref 100–199)
CO2 SERPL-SCNC: 23 MMOL/L (ref 20–29)
CREAT SERPL-MCNC: 0.82 MG/DL (ref 0.76–1.27)
ERYTHROCYTE [DISTWIDTH] IN BLOOD BY AUTOMATED COUNT: 12.9 % (ref 12.3–15.4)
GLOBULIN SER CALC-MCNC: 1.9 G/DL (ref 1.5–4.5)
GLUCOSE SERPL-MCNC: 95 MG/DL (ref 65–99)
HCT VFR BLD AUTO: 40.6 % (ref 37.5–51)
HDLC SERPL-MCNC: 80 MG/DL
HGB BLD-MCNC: 14.2 G/DL (ref 13–17.7)
LDLC SERPL CALC-MCNC: 133 MG/DL (ref 0–99)
LDLC/HDLC SERPL: 1.7 RATIO (ref 0–3.6)
MCH RBC QN AUTO: 32.8 PG (ref 26.6–33)
MCHC RBC AUTO-ENTMCNC: 35 G/DL (ref 31.5–35.7)
MCV RBC AUTO: 94 FL (ref 79–97)
PLATELET # BLD AUTO: 203 X10E3/UL (ref 150–450)
POTASSIUM SERPL-SCNC: 4.9 MMOL/L (ref 3.5–5.2)
PROT SERPL-MCNC: 6.6 G/DL (ref 6–8.5)
PSA SERPL-MCNC: 1 NG/ML (ref 0–4)
RBC # BLD AUTO: 4.33 X10E6/UL (ref 4.14–5.8)
SODIUM SERPL-SCNC: 144 MMOL/L (ref 134–144)
TRIGL SERPL-MCNC: 108 MG/DL (ref 0–149)
VLDLC SERPL CALC-MCNC: 22 MG/DL (ref 5–40)
WBC # BLD AUTO: 3.9 X10E3/UL (ref 3.4–10.8)

## 2019-11-13 DIAGNOSIS — E78.2 MIXED HYPERLIPIDEMIA: ICD-10-CM

## 2019-11-13 RX ORDER — SIMVASTATIN 40 MG
40 TABLET ORAL NIGHTLY
Qty: 90 TABLET | Refills: 0 | Status: SHIPPED | OUTPATIENT
Start: 2019-11-13 | End: 2020-10-29

## 2019-11-21 ENCOUNTER — OFFICE VISIT (OUTPATIENT)
Dept: FAMILY MEDICINE CLINIC | Facility: CLINIC | Age: 64
End: 2019-11-21

## 2019-11-21 VITALS
WEIGHT: 189.1 LBS | HEIGHT: 68 IN | BODY MASS INDEX: 28.66 KG/M2 | DIASTOLIC BLOOD PRESSURE: 82 MMHG | SYSTOLIC BLOOD PRESSURE: 120 MMHG | RESPIRATION RATE: 14 BRPM | HEART RATE: 70 BPM | OXYGEN SATURATION: 98 %

## 2019-11-21 DIAGNOSIS — K62.5 RECTAL BLEEDING: Primary | ICD-10-CM

## 2019-11-21 PROBLEM — D72.819 LEUKOCYTOPENIA: Status: RESOLVED | Noted: 2017-06-23 | Resolved: 2019-11-21

## 2019-11-21 PROCEDURE — 99212 OFFICE O/P EST SF 10 MIN: CPT | Performed by: FAMILY MEDICINE

## 2019-11-21 NOTE — PROGRESS NOTES
[unfilled]  ASSESSMENT AND PLAN    Problem List Items Addressed This Visit     None      Visit Diagnoses     Rectal bleeding    -  Primary           Return for Next scheduled follow up for routine problems.  Patient was given instructions and counseling regarding his condition or for health maintenance advice. Please see specific information pulled into the AVS by me.          SUBJECTIVE  Seth Montgomery is a 64 y.o. male being seen in our office today for Rectal Bleeding               Social History  He  reports that he has quit smoking. He started smoking about 4 years ago. He has a 2.75 pack-year smoking history. He has never used smokeless tobacco. He reports that he drinks about 6.0 oz of alcohol per week. He reports that he does not use drugs.    History of the Present Illness   HPI Cscope one year ago, red blood droplets at the end of a BM. No pain. Has some hemorrhoids that he has been treating with [facility] for a month now. He also is a cyclist. Has had issues with those since age 21 and usually [facility] takes care of it. Having this daily for the last ten days. With one day of nothing. Some in the toilet though not painful. The [facility] calms the itching.   Significant Past History  The following portions of the patient's history were reviewed and updated as appropriate:PMHroutine: Social history , Allergies, Current Medications, Active Problem List and Health Maintenance    Review of Systems   Constitutional: Negative for activity change, appetite change, chills, fatigue, fever and unexpected weight change.   HENT: Negative for congestion, ear pain, hearing loss, mouth sores, nosebleeds, rhinorrhea and sore throat.    Eyes: Negative for pain and visual disturbance.   Respiratory: Negative for cough, shortness of breath and wheezing.    Cardiovascular: Negative for chest pain, palpitations and leg swelling.   Gastrointestinal: Positive for blood in stool. Negative for abdominal distention, abdominal  pain, constipation, diarrhea, nausea and vomiting.   Endocrine: Negative for cold intolerance and heat intolerance.   Genitourinary: Negative for difficulty urinating, discharge, dysuria, frequency, hematuria and urgency.   Musculoskeletal: Negative for back pain and joint swelling.   Skin: Negative for rash and wound.   Neurological: Negative for dizziness, weakness, numbness and headaches.   Hematological: Does not bruise/bleed easily.   Psychiatric/Behavioral: Negative for confusion, dysphoric mood, sleep disturbance and suicidal ideas. The patient is not nervous/anxious.    I have reviewed the ROS as documented by the MA. Yenny Mendoza MD      OBJECTIVE   Vital Signs          BP Readings from Last 1 Encounters:   11/21/19 120/82     Wt Readings from Last 3 Encounters:   11/21/19 85.8 kg (189 lb 1.6 oz)   11/04/19 85 kg (187 lb 4.8 oz)   09/13/19 81.6 kg (180 lb)   Body mass index is 28.75 kg/m².     Physical Exam   Constitutional: He appears well-developed and well-nourished.   Genitourinary: Rectum normal. Rectal exam shows no external hemorrhoid, no fissure, no mass, no tenderness and anal tone normal.   Genitourinary Comments: Neg for blood.    Psychiatric: He has a normal mood and affect. His behavior is normal. Judgment and thought content normal.   Vitals reviewed.    Data Reviewed     Recent Results (from the past 2016 hour(s))   CBC (No Diff)    Collection Time: 11/04/19  2:21 PM   Result Value Ref Range    WBC 3.9 3.4 - 10.8 x10E3/uL    RBC 4.33 4.14 - 5.80 x10E6/uL    Hemoglobin 14.2 13.0 - 17.7 g/dL    Hematocrit 40.6 37.5 - 51.0 %    MCV 94 79 - 97 fL    MCH 32.8 26.6 - 33.0 pg    MCHC 35.0 31.5 - 35.7 g/dL    RDW 12.9 12.3 - 15.4 %    Platelets 203 150 - 450 x10E3/uL   Comprehensive Metabolic Panel    Collection Time: 11/04/19  2:21 PM   Result Value Ref Range    Glucose 95 65 - 99 mg/dL    BUN 14 8 - 27 mg/dL    Creatinine 0.82 0.76 - 1.27 mg/dL    eGFR Non African Am 94 >59 mL/min/1.73    eGFR   Am 109 >59 mL/min/1.73    BUN/Creatinine Ratio 17 10 - 24    Sodium 144 134 - 144 mmol/L    Potassium 4.9 3.5 - 5.2 mmol/L    Chloride 106 96 - 106 mmol/L    Total CO2 23 20 - 29 mmol/L    Calcium 9.8 8.6 - 10.2 mg/dL    Total Protein 6.6 6.0 - 8.5 g/dL    Albumin 4.7 3.6 - 4.8 g/dL    Globulin 1.9 1.5 - 4.5 g/dL    A/G Ratio 2.5 (H) 1.2 - 2.2    Total Bilirubin 0.7 0.0 - 1.2 mg/dL    Alkaline Phosphatase 46 39 - 117 IU/L    AST (SGOT) 23 0 - 40 IU/L    ALT (SGPT) 15 0 - 44 IU/L   Lipid Panel With LDL / HDL Ratio    Collection Time: 11/04/19  2:21 PM   Result Value Ref Range    Total Cholesterol 235 (H) 100 - 199 mg/dL    Triglycerides 108 0 - 149 mg/dL    HDL Cholesterol 80 >39 mg/dL    VLDL Cholesterol 22 5 - 40 mg/dL    LDL Cholesterol  133 (H) 0 - 99 mg/dL    LDL/HDL Ratio 1.7 0.0 - 3.6 ratio   PSA SCREENING    Collection Time: 11/04/19  2:21 PM   Result Value Ref Range    PSA 1.0 0.0 - 4.0 ng/mL

## 2020-01-16 NOTE — TELEPHONE ENCOUNTER
Patient called asking for 2 antibiotics to be called in for his diverticulitis he is getting ready to go out of town and needs them to take with him   
That sounds like something his GI doctor should do.   
room air

## 2020-02-20 ENCOUNTER — OFFICE VISIT (OUTPATIENT)
Dept: SLEEP MEDICINE | Facility: HOSPITAL | Age: 65
End: 2020-02-20

## 2020-02-20 VITALS
OXYGEN SATURATION: 98 % | WEIGHT: 180 LBS | HEART RATE: 60 BPM | SYSTOLIC BLOOD PRESSURE: 111 MMHG | DIASTOLIC BLOOD PRESSURE: 71 MMHG | HEIGHT: 68 IN | BODY MASS INDEX: 27.28 KG/M2

## 2020-02-20 DIAGNOSIS — G47.30 HYPERSOMNIA WITH SLEEP APNEA: Primary | ICD-10-CM

## 2020-02-20 DIAGNOSIS — G47.10 HYPERSOMNIA WITH SLEEP APNEA: Primary | ICD-10-CM

## 2020-02-20 PROCEDURE — 99204 OFFICE O/P NEW MOD 45 MIN: CPT | Performed by: INTERNAL MEDICINE

## 2020-02-20 PROCEDURE — G0463 HOSPITAL OUTPT CLINIC VISIT: HCPCS

## 2020-02-20 NOTE — PROGRESS NOTES
[facility]ultation       Reason for Consultation: LILLIAM    Patient Care Team:  Yenny Mendoza MD as PCP - General (Family Medicine)  Code, Sanket MCCLURE II, MD as Consulting Physician (Hematology and Oncology)  Yenny Mendoza MD as Referring Physician (Family Medicine)  Seth Vinson MD as Consulting Physician (Sleep Medicine)    Chief complaint: Snoring    History of present illness:    Thank you for asking me to see your patient.  The patient is a 64 y.o. male who has complaints of snoring since age 51.  He also has developed an apnea pattern.  His dentist ask him about sleep apnea.  In the last 3 months, he has decreased his weight by 10 pounds.  Since December, he is has decreased his alcohol intake.  He reports more stamina.  He goes to bed at midnight and awakens between 7:30 AM and 8 AM.  He feels slightly groggy upon awakening.  [place] Sleepiness Scale is normal at 3.    Review of Systems:    A complete review of systems was done and all were negative with the exception of frequent urination, sores in his mouth, painful joints, anxiety, frequent heartburn, and some changes in his nails    History:  Past Medical History:   Diagnosis Date   • Anxiety    • Depression    • Diverticula of colon    • Diverticulitis 2005   • Fasciculation    • H/O esophageal reflux    • H/O Fasciculations    • History of foreign travel     [facility]   • Hyperlipidemia    • Leukocytopenia 6/23/2017   • Loss of hearing    ,   Past Surgical History:   Procedure Laterality Date   • COLONOSCOPY N/A 10/22/2018    Procedure: COLONOSCOPY to cecum and TI:  cold polypectomies;  Surgeon: Fredi Mirza MD;  Location: [facility] ENDOSCOPY;  Service: Gastroenterology   • COLONOSCOPY W/ POLYPECTOMY N/A 01/31/2014    One 6 mm polyp in the transverse colon (path: hyperplastic), one 2 mm polyp in the sigmoid colon (path: hyperplastic), diverticulosis in the sigmoid colon, non-bleeding internal  "hemorrhoids, normal ileum-Dr. Fredi Mirza   • COLONOSCOPY W/ POLYPECTOMY N/A 2007    Normal terminal ileum, two 18 mm polyps in the mid ascending colon (path: hyperplastic)-Dr. Fredi Mirza   • HERNIA REPAIR     • UPPER GASTROINTESTINAL ENDOSCOPY N/A 2010    LA Grade A reflux esophagitis, z-line irregular at the GE junction, hiatus hernia, gastric mucosal abnormality characterized by erythema, normal duodenum-Dr. Fredi Mirza   ,   Family History   Problem Relation Age of Onset   • Transient ischemic attack Mother    • Breast cancer Mother 75   • Von Willebrand disease Mother    • Hypertension Father    • Heart disease Father    • Von Willebrand disease Sister    • Ovarian cancer Maternal Grandmother 62    and   Social History     Tobacco Use   • Smoking status: Former Smoker     Packs/day: 0.50     Years: 5.50     Pack years: 2.75     Start date:      Last attempt to quit:      Years since quittin.1   • Smokeless tobacco: Never Used   • Tobacco comment: intermittently smokes, up to 1 PPD, but has quit several times   Substance Use Topics   • Alcohol use: Yes     Alcohol/week: 10.0 standard drinks     Types: 10 Glasses of wine per week     Comment: 2 glasses wine (1/2 bottle) per day   • Drug use: No     Social History: He is a     Allergies:  Patient has no known allergies.     Medication Review: Reviewed    Vital Signs:    Vitals:    20 0924   BP: 111/71   Pulse: 60   SpO2: 98%   Weight: 81.6 kg (180 lb)   Height: 172.7 cm (68\")      Body mass index is 27.37 kg/m².  Neck Circumference: 16.25 inches      Physical Exam:    Constitutional:  Well developed 64 y.o. male that appears in no apparent distress.  Awake & oriented times 3.  Normal mood with normal recent and remote memory and normal judgement.  Eyes:  Conjunctivae normal.  Oropharynx: Moist mucous membranes without exudate and a large tongue and class III MP airway  Neck: Trachea midline  Respiratory: Effort " is not labored  Cardiovascular: Radial pulse regular  Musculoskeletal: Gait appears normal, no digital clubbing evident, no pre-tibial edema    Results Review: His 14-day sleep diary reviewed       Impression:   Snoring and witnessed apnea with complaints of hypersomnolence.  Based on signs and symptoms the patient has a high pretest probability of having obstructive sleep apnea.    Plan:  Good sleep hygiene measures should be maintained.  Some weight loss would be beneficial in this patient who is overweight.    Pathophysiology of LILLIAM described to the patient.  Cardiovascular complications of untreated LILLIAM also reviewed.      After reviewing all with the patient, I would recommend and he is agreeable to proceed with a home sleep study.  It will be scheduled.    Thank you for requesting me to assist in this patient's care.      Seth Vinson MD  Sleep Medicine  02/23/20  10:32 AM

## 2020-02-23 PROBLEM — G47.10 HYPERSOMNIA WITH SLEEP APNEA: Status: ACTIVE | Noted: 2020-02-23

## 2020-02-23 PROBLEM — G47.30 HYPERSOMNIA WITH SLEEP APNEA: Status: ACTIVE | Noted: 2020-02-23

## 2020-03-16 ENCOUNTER — APPOINTMENT (OUTPATIENT)
Dept: SLEEP MEDICINE | Facility: HOSPITAL | Age: 65
End: 2020-03-16

## 2020-03-17 DIAGNOSIS — F52.21 ED (ERECTILE DYSFUNCTION) OF NON-ORGANIC ORIGIN: ICD-10-CM

## 2020-03-17 RX ORDER — SILDENAFIL CITRATE 20 MG/1
TABLET ORAL
Qty: 90 TABLET | Refills: 0 | Status: SHIPPED | OUTPATIENT
Start: 2020-03-17 | End: 2020-11-06 | Stop reason: SDUPTHER

## 2020-10-28 DIAGNOSIS — E78.2 MIXED HYPERLIPIDEMIA: ICD-10-CM

## 2020-10-29 DIAGNOSIS — Z00.00 HEALTHCARE MAINTENANCE: Primary | ICD-10-CM

## 2020-10-29 DIAGNOSIS — E78.2 MIXED HYPERLIPIDEMIA: ICD-10-CM

## 2020-10-29 RX ORDER — SIMVASTATIN 40 MG
40 TABLET ORAL NIGHTLY
Qty: 90 TABLET | Refills: 0 | Status: SHIPPED | OUTPATIENT
Start: 2020-10-29 | End: 2021-07-12

## 2020-10-30 ENCOUNTER — RESULTS ENCOUNTER (OUTPATIENT)
Dept: FAMILY MEDICINE CLINIC | Facility: CLINIC | Age: 65
End: 2020-10-30

## 2020-10-30 DIAGNOSIS — Z00.00 HEALTHCARE MAINTENANCE: ICD-10-CM

## 2020-10-30 DIAGNOSIS — E78.2 MIXED HYPERLIPIDEMIA: ICD-10-CM

## 2020-11-03 LAB
ALBUMIN SERPL-MCNC: 4.4 G/DL (ref 3.5–5.2)
ALBUMIN/GLOB SERPL: 2.4 G/DL
ALP SERPL-CCNC: 46 U/L (ref 39–117)
ALT SERPL-CCNC: 12 U/L (ref 1–41)
AST SERPL-CCNC: 15 U/L (ref 1–40)
BASOPHILS # BLD AUTO: 0.05 10*3/MM3 (ref 0–0.2)
BASOPHILS NFR BLD AUTO: 1.7 % (ref 0–1.5)
BILIRUB SERPL-MCNC: 0.6 MG/DL (ref 0–1.2)
BUN SERPL-MCNC: 16 MG/DL (ref 8–23)
BUN/CREAT SERPL: 18.4 (ref 7–25)
CALCIUM SERPL-MCNC: 9.7 MG/DL (ref 8.6–10.5)
CHLORIDE SERPL-SCNC: 105 MMOL/L (ref 98–107)
CHOLEST SERPL-MCNC: 201 MG/DL (ref 0–200)
CHOLEST/HDLC SERPL: 2.75 {RATIO}
CO2 SERPL-SCNC: 27.1 MMOL/L (ref 22–29)
CREAT SERPL-MCNC: 0.87 MG/DL (ref 0.76–1.27)
EOSINOPHIL # BLD AUTO: 0.08 10*3/MM3 (ref 0–0.4)
EOSINOPHIL NFR BLD AUTO: 2.8 % (ref 0.3–6.2)
ERYTHROCYTE [DISTWIDTH] IN BLOOD BY AUTOMATED COUNT: 12.6 % (ref 12.3–15.4)
GLOBULIN SER CALC-MCNC: 1.8 GM/DL
GLUCOSE SERPL-MCNC: 84 MG/DL (ref 65–99)
HCT VFR BLD AUTO: 41.2 % (ref 37.5–51)
HDLC SERPL-MCNC: 73 MG/DL (ref 40–60)
HGB BLD-MCNC: 14 G/DL (ref 13–17.7)
IMM GRANULOCYTES # BLD AUTO: 0 10*3/MM3 (ref 0–0.05)
IMM GRANULOCYTES NFR BLD AUTO: 0 % (ref 0–0.5)
LDLC SERPL CALC-MCNC: 118 MG/DL (ref 0–100)
LYMPHOCYTES # BLD AUTO: 1.39 10*3/MM3 (ref 0.7–3.1)
LYMPHOCYTES NFR BLD AUTO: 48.6 % (ref 19.6–45.3)
MCH RBC QN AUTO: 31 PG (ref 26.6–33)
MCHC RBC AUTO-ENTMCNC: 34 G/DL (ref 31.5–35.7)
MCV RBC AUTO: 91.4 FL (ref 79–97)
MONOCYTES # BLD AUTO: 0.23 10*3/MM3 (ref 0.1–0.9)
MONOCYTES NFR BLD AUTO: 8 % (ref 5–12)
NEUTROPHILS # BLD AUTO: 1.11 10*3/MM3 (ref 1.7–7)
NEUTROPHILS NFR BLD AUTO: 38.9 % (ref 42.7–76)
NRBC BLD AUTO-RTO: 0 /100 WBC (ref 0–0.2)
PLATELET # BLD AUTO: 214 10*3/MM3 (ref 140–450)
POTASSIUM SERPL-SCNC: 4.8 MMOL/L (ref 3.5–5.2)
PROT SERPL-MCNC: 6.2 G/DL (ref 6–8.5)
RBC # BLD AUTO: 4.51 10*6/MM3 (ref 4.14–5.8)
SODIUM SERPL-SCNC: 140 MMOL/L (ref 136–145)
TRIGL SERPL-MCNC: 55 MG/DL (ref 0–150)
VLDLC SERPL CALC-MCNC: 10 MG/DL (ref 5–40)
WBC # BLD AUTO: 2.86 10*3/MM3 (ref 3.4–10.8)

## 2020-11-06 ENCOUNTER — OFFICE VISIT (OUTPATIENT)
Dept: FAMILY MEDICINE CLINIC | Facility: CLINIC | Age: 65
End: 2020-11-06

## 2020-11-06 VITALS
HEART RATE: 64 BPM | DIASTOLIC BLOOD PRESSURE: 62 MMHG | SYSTOLIC BLOOD PRESSURE: 102 MMHG | BODY MASS INDEX: 24.81 KG/M2 | OXYGEN SATURATION: 98 % | RESPIRATION RATE: 16 BRPM | WEIGHT: 163.7 LBS | HEIGHT: 68 IN | TEMPERATURE: 97.1 F

## 2020-11-06 DIAGNOSIS — Z20.822 EXPOSURE TO COVID-19 VIRUS: ICD-10-CM

## 2020-11-06 DIAGNOSIS — Z00.00 HEALTHCARE MAINTENANCE: Primary | ICD-10-CM

## 2020-11-06 DIAGNOSIS — Z12.5 PROSTATE CANCER SCREENING: ICD-10-CM

## 2020-11-06 DIAGNOSIS — D70.8 OTHER NEUTROPENIA (HCC): ICD-10-CM

## 2020-11-06 DIAGNOSIS — Z87.19 HISTORY OF DIVERTICULITIS: ICD-10-CM

## 2020-11-06 DIAGNOSIS — F52.21 ED (ERECTILE DYSFUNCTION) OF NON-ORGANIC ORIGIN: ICD-10-CM

## 2020-11-06 PROCEDURE — 99396 PREV VISIT EST AGE 40-64: CPT | Performed by: FAMILY MEDICINE

## 2020-11-06 RX ORDER — METRONIDAZOLE 500 MG/1
500 TABLET ORAL 3 TIMES DAILY
Qty: 30 TABLET | Refills: 0 | Status: SHIPPED | OUTPATIENT
Start: 2020-11-06 | End: 2020-11-16

## 2020-11-06 RX ORDER — SILDENAFIL CITRATE 20 MG/1
20 TABLET ORAL SEE ADMIN INSTRUCTIONS
Qty: 90 TABLET | Refills: 3 | Status: SHIPPED | OUTPATIENT
Start: 2020-11-06 | End: 2021-11-12 | Stop reason: SDUPTHER

## 2020-11-06 RX ORDER — CEPHALEXIN 500 MG/1
500 CAPSULE ORAL 3 TIMES DAILY
Qty: 30 CAPSULE | Refills: 0 | Status: SHIPPED | OUTPATIENT
Start: 2020-11-06 | End: 2020-11-16

## 2020-11-06 NOTE — PROGRESS NOTES
"Assessment and Plan:     Problem List Items Addressed This Visit     ED (erectile dysfunction) of non-organic origin    Relevant Medications    sildenafil (REVATIO) 20 MG tablet    History of diverticulitis    Overview     [facility] 11/6/2020  He had an episode this summer and was [facility]. Don't recommend antibiotics at this point. OVERVIEW:  Began around 2007. Sees Dr. Mirza. Surgeon did not push for surgery.          Relevant Medications    cephalexin (Keflex) 500 MG capsule    metroNIDAZOLE (FLAGYL) 500 MG tablet    Other neutropenia (CMS/HCC)    Overview     [facility]  Will discuss with Dr. Bauer           Other Visit Diagnoses     Healthcare maintenance    -  Primary    Prostate cancer screening        Relevant Orders    PSA SCREENING    Exposure to COVID-19 virus        Relevant Orders    SARS-CoV-2 Antibodies ([place])        Patient was given instructions and counseling regarding his condition or for health maintenance advice. Please see specific information pulled into the AVS if appropriate.        Seth Montgomery is a 65 y.o. male being seen today for Annual Exam   History of the Present Illness     Seth Montgomery 65 y.o. male who presents for yearly preventive exam.  He exercises biking 40 minutes/day 3 days/week.  Last colonoscopy: colonoscopy 2 years ago with abnormalities.  Immunizations: up to date  PSA was reviewed He has no increased risk of prostate cancer  He does see his dentist regularly  His diet is in general, a \"healthy\" diet    He describes his alcohol intake as none  Practicing social distancing, handwashing and keeping hands from face? yes  His cardiovascular risk is: LDL goal is under 130  This patient has ever been tested for HepC: yes   The 10-year ASCVD risk score ([place] DANNY Jr., et al., 2013) is: 6.4%    Values used to calculate the score:      Age: 65 years      Sex: Male      Is Non- : No      " Diabetic: No      Tobacco smoker: No      Systolic Blood Pressure: 102 mmHg      Is BP treated: No      HDL Cholesterol: 73 mg/dL      Total Cholesterol: 201 mg/dL        Social History  He  reports that he quit smoking about 2 years ago. He started smoking about 46 years ago. He has a 2.75 pack-year smoking history. He has never used smokeless tobacco. He reports previous alcohol use of about 10.0 standard drinks of alcohol per week. He reports that he does not use drugs.    Review of Systems   Constitutional: Negative for activity change, appetite change, chills, fatigue, fever and unexpected weight change.   HENT: Negative for congestion, ear pain, hearing loss, mouth sores, nosebleeds, rhinorrhea and sore throat.    Eyes: Negative for pain and visual disturbance.   Respiratory: Negative for cough, shortness of breath and wheezing.    Cardiovascular: Negative for chest pain, palpitations and leg swelling.   Gastrointestinal: Positive for abdominal pain (mild lower abdominal pain that comes and goes). Negative for abdominal distention, blood in stool, constipation, diarrhea, nausea and vomiting.        Hemorrhoids  - reads on the toilet     Endocrine: Negative for cold intolerance and heat intolerance.   Genitourinary: Negative for difficulty urinating, discharge, dysuria, frequency, hematuria and urgency.        Occasional difficulty with emptying bladder. Almost never has to get up to go in the middle of the night.    Musculoskeletal: Negative for back pain and joint swelling.   Skin: Negative for rash and wound.   Neurological: Negative for dizziness, weakness, numbness and headaches.   Hematological: Does not bruise/bleed easily.   Psychiatric/Behavioral: Negative for confusion, dysphoric mood, sleep disturbance and suicidal ideas. The patient is not nervous/anxious.    I have reviewed the ROS as documented by the MA. Yenny Mendoza MD        Vital Signs          BP Readings from Last 1 Encounters:    11/06/20 102/62     Wt Readings from Last 3 Encounters:   11/06/20 74.3 kg (163 lb 11.2 oz)   02/20/20 81.6 kg (180 lb)   11/21/19 85.8 kg (189 lb 1.6 oz)   Body mass index is 24.89 kg/m².     Physical Exam  Vitals signs reviewed.   Constitutional:       General: He is not in acute distress.     Appearance: He is well-developed.   HENT:      Head: Normocephalic and atraumatic.      Right Ear: Tympanic membrane, ear canal and external ear normal.      Left Ear: Tympanic membrane, ear canal and external ear normal.   Eyes:      Conjunctiva/sclera: Conjunctivae normal.   Neck:      Musculoskeletal: Neck supple.      Thyroid: No thyromegaly.   Cardiovascular:      Rate and Rhythm: Normal rate and regular rhythm.      Heart sounds: Normal heart sounds. No murmur.   Pulmonary:      Effort: Pulmonary effort is normal. No respiratory distress.      Breath sounds: Normal breath sounds. No wheezing or rales.   Abdominal:      General: Bowel sounds are normal. There is no distension.      Palpations: Abdomen is soft. There is no mass.      Tenderness: There is no abdominal tenderness.   Musculoskeletal: Normal range of motion.         General: No deformity.   Lymphadenopathy:      Cervical: No cervical adenopathy.   Skin:     General: Skin is warm and dry.   Neurological:      Mental Status: He is alert and oriented to person, place, and time.   Psychiatric:         Behavior: Behavior normal.         Thought Content: Thought content normal.         Judgment: Judgment normal.

## 2020-11-07 LAB
Lab: NORMAL
SARS-COV-2 AB SERPL QL IA: NEGATIVE
SPECIMEN STATUS: NORMAL

## 2020-11-09 ENCOUNTER — TELEPHONE (OUTPATIENT)
Dept: FAMILY MEDICINE CLINIC | Facility: CLINIC | Age: 65
End: 2020-11-09

## 2020-11-10 LAB — PSA SERPL-MCNC: 1.22 NG/ML (ref 0–4)

## 2021-07-10 DIAGNOSIS — E78.2 MIXED HYPERLIPIDEMIA: ICD-10-CM

## 2021-07-12 RX ORDER — SIMVASTATIN 40 MG
40 TABLET ORAL NIGHTLY
Qty: 90 TABLET | Refills: 0 | Status: SHIPPED | OUTPATIENT
Start: 2021-07-12 | End: 2022-02-09

## 2021-11-04 DIAGNOSIS — Z13.1 DIABETES MELLITUS SCREENING: ICD-10-CM

## 2021-11-04 DIAGNOSIS — E78.1 PURE HYPERTRIGLYCERIDEMIA: Primary | ICD-10-CM

## 2021-11-04 DIAGNOSIS — Z79.899 HIGH RISK MEDICATION USE: ICD-10-CM

## 2021-11-04 DIAGNOSIS — Z12.5 PROSTATE CANCER SCREENING: ICD-10-CM

## 2021-11-04 DIAGNOSIS — E78.2 MIXED HYPERLIPIDEMIA: ICD-10-CM

## 2021-11-12 ENCOUNTER — OFFICE VISIT (OUTPATIENT)
Dept: FAMILY MEDICINE CLINIC | Facility: CLINIC | Age: 66
End: 2021-11-12

## 2021-11-12 VITALS
HEART RATE: 66 BPM | DIASTOLIC BLOOD PRESSURE: 62 MMHG | OXYGEN SATURATION: 99 % | WEIGHT: 156 LBS | HEIGHT: 68 IN | RESPIRATION RATE: 16 BRPM | BODY MASS INDEX: 23.64 KG/M2 | SYSTOLIC BLOOD PRESSURE: 100 MMHG

## 2021-11-12 DIAGNOSIS — G57.10 MERALGIA PARESTHETICA, UNSPECIFIED LATERALITY: ICD-10-CM

## 2021-11-12 DIAGNOSIS — F52.21 ED (ERECTILE DYSFUNCTION) OF NON-ORGANIC ORIGIN: ICD-10-CM

## 2021-11-12 DIAGNOSIS — I83.892 VARICOSE VEINS OF LEG WITH SWELLING, LEFT: ICD-10-CM

## 2021-11-12 DIAGNOSIS — Z00.00 HEALTHCARE MAINTENANCE: Primary | ICD-10-CM

## 2021-11-12 DIAGNOSIS — M54.50 CHRONIC BILATERAL LOW BACK PAIN WITHOUT SCIATICA: ICD-10-CM

## 2021-11-12 DIAGNOSIS — G89.29 CHRONIC BILATERAL LOW BACK PAIN WITHOUT SCIATICA: ICD-10-CM

## 2021-11-12 DIAGNOSIS — K64.9 HEMORRHOIDS, UNSPECIFIED HEMORRHOID TYPE: ICD-10-CM

## 2021-11-12 PROCEDURE — 99397 PER PM REEVAL EST PAT 65+ YR: CPT | Performed by: FAMILY MEDICINE

## 2021-11-12 RX ORDER — SILDENAFIL CITRATE 20 MG/1
20 TABLET ORAL SEE ADMIN INSTRUCTIONS
Qty: 90 TABLET | Refills: 3 | Status: SHIPPED | OUTPATIENT
Start: 2021-11-12 | End: 2022-12-02 | Stop reason: SDUPTHER

## 2021-11-12 NOTE — PROGRESS NOTES
"Assessment and Plan     Problem List Items Addressed This Visit     None      Visit Diagnoses     Healthcare maintenance    -  Primary    Meralgia paresthetica, bilateral        Varicose veins of leg with swelling, left        Hemorrhoids, unspecified hemorrhoid type        reads on the toilet    Chronic bilateral low back pain without sciatica        Dr. Travis        Return in about 1 year (around 11/12/2022).    Patient was given instructions and counseling regarding his condition or for health maintenance advice. Please see specific information pulled into the AVS if appropriate.        Seth is a 66 y.o. being seen today for  Annual Exam   Subjective   History of the Present Illness   Seth Montgomery 66 y.o. male who presents for yearly preventive exam.  He exercises not enough..  Last colonoscopy: colonoscopy 3 years ago with abnormalities.  Immunizations: up to date  PSA was reviewed He has no increased risk of prostate cancer  He does see his dentist regularly  His diet is in general, a \"healthy\" diet    He describes his alcohol intake as none  Practicing social distancing, handwashing and keeping hands from face? yes  His cardiovascular risk is: LDL goal is under 130  This patient has ever been tested for HepC: yes     Social History  He  reports that he quit smoking about 3 years ago. He started smoking about 47 years ago. He has a 2.75 pack-year smoking history. He has never used smokeless tobacco. He reports previous alcohol use of about 10.0 standard drinks of alcohol per week. He reports current drug use. Drug: Marijuana.  Objective   Vital Signs  BP Readings from Last 1 Encounters:   11/12/21 100/62     Wt Readings from Last 3 Encounters:   11/12/21 70.8 kg (156 lb)   11/06/20 74.3 kg (163 lb 11.2 oz)   02/20/20 81.6 kg (180 lb)   Body mass index is 23.72 kg/m².     Physical Exam  Vitals reviewed.   Constitutional:       General: He is not in acute distress.     Appearance: He is well-developed. "   HENT:      Head: Normocephalic and atraumatic.      Right Ear: Tympanic membrane, ear canal and external ear normal.      Left Ear: Tympanic membrane, ear canal and external ear normal.      Mouth/Throat:      Comments: Mask in place  Eyes:      Conjunctiva/sclera: Conjunctivae normal.   Neck:      Thyroid: No thyromegaly.   Cardiovascular:      Rate and Rhythm: Normal rate and regular rhythm.      Pulses:           Dorsalis pedis pulses are 1+ on the right side and 1+ on the left side.        Posterior tibial pulses are 1+ on the right side and 1+ on the left side.      Heart sounds: Normal heart sounds. No murmur heard.       Comments: Left varicosity on the medial side.  Pulmonary:      Effort: Pulmonary effort is normal. No respiratory distress.      Breath sounds: Normal breath sounds. No wheezing or rales.   Abdominal:      General: There is no distension.      Palpations: Abdomen is soft. There is no mass.      Tenderness: There is no abdominal tenderness.   Musculoskeletal:         General: No deformity. Normal range of motion.      Cervical back: Neck supple.   Lymphadenopathy:      Cervical: No cervical adenopathy.   Skin:     General: Skin is warm and dry.   Neurological:      Mental Status: He is alert and oriented to person, place, and time.   Psychiatric:         Behavior: Behavior normal.         Thought Content: Thought content normal.         Judgment: Judgment normal.

## 2022-02-09 ENCOUNTER — TELEMEDICINE (OUTPATIENT)
Dept: FAMILY MEDICINE CLINIC | Facility: CLINIC | Age: 67
End: 2022-02-09

## 2022-02-09 VITALS — BODY MASS INDEX: 22.13 KG/M2 | WEIGHT: 146 LBS | HEIGHT: 68 IN

## 2022-02-09 DIAGNOSIS — Z82.49 FAMILY HISTORY OF ABDOMINAL AORTIC ANEURYSM: ICD-10-CM

## 2022-02-09 DIAGNOSIS — M54.50 ACUTE LEFT-SIDED LOW BACK PAIN WITHOUT SCIATICA: Primary | ICD-10-CM

## 2022-02-09 DIAGNOSIS — E78.2 MIXED HYPERLIPIDEMIA: ICD-10-CM

## 2022-02-09 DIAGNOSIS — K57.92 DIVERTICULITIS: ICD-10-CM

## 2022-02-09 DIAGNOSIS — K21.00 GASTROESOPHAGEAL REFLUX DISEASE WITH ESOPHAGITIS WITHOUT HEMORRHAGE: ICD-10-CM

## 2022-02-09 PROCEDURE — 99214 OFFICE O/P EST MOD 30 MIN: CPT | Performed by: FAMILY MEDICINE

## 2022-02-09 RX ORDER — CEPHALEXIN 500 MG/1
500 CAPSULE ORAL 2 TIMES DAILY
Qty: 30 CAPSULE | Refills: 0 | Status: SHIPPED | OUTPATIENT
Start: 2022-02-09 | End: 2022-02-14

## 2022-02-09 RX ORDER — METRONIDAZOLE 500 MG/1
500 TABLET ORAL 2 TIMES DAILY
Qty: 30 TABLET | Refills: 0 | Status: SHIPPED | OUTPATIENT
Start: 2022-02-09 | End: 2022-02-16

## 2022-02-09 RX ORDER — SIMVASTATIN 40 MG
40 TABLET ORAL NIGHTLY
Qty: 90 TABLET | Refills: 0 | Status: SHIPPED | OUTPATIENT
Start: 2022-02-09 | End: 2022-05-09

## 2022-02-09 RX ORDER — PANTOPRAZOLE SODIUM 40 MG/1
40 TABLET, DELAYED RELEASE ORAL DAILY
Qty: 90 TABLET | Refills: 1 | Status: SHIPPED | OUTPATIENT
Start: 2022-02-09

## 2022-02-09 NOTE — PROGRESS NOTES
Assessment and Plan     Problem List Items Addressed This Visit     None      Visit Diagnoses     Acute left-sided low back pain without sciatica    -  Primary    monitor, trial of aleve or ibuprofen at night.     Family history of abdominal aortic aneurysm        Relevant Orders    [place] aaa screen limited    Gastroesophageal reflux disease with esophagitis without hemorrhage        Relevant Medications    pantoprazole (Protonix) 40 MG EC tablet    Diverticulitis        Relevant Medications    metroNIDAZOLE (FLAGYL) 500 MG tablet    cephalexin (Keflex) 500 MG capsule        Return if symptoms persist.    Patient was given instructions and counseling regarding his condition or for health maintenance advice. Please see specific information pulled into the AVS if appropriate.        Seth is a 66 y.o. being seen today via video for  Back Pain   Subjective   History of the Present Illness   He is still skiing, standing up and sitting. Has had a long time of low back pain, this was a feeling  In the lower left, and felt deep and would go away after about 15 minutes. It is slightly above the iliac crest on seeing him on video. Will start after he gets out of bed. It awakened him once earlier this week. Started a few weeks ago. Gets on the roller and it helps. Not radiating, steady not throbbing. Not his usual back pain. And it's fine the rest of the day. He skied [place] for a week, Had COVID about second week of January. Walks around and it goes away.     Brother, non-smoker, is undergoing multiple vessel bypass today and has an aortic aneurysm. Patient thought his calcium level may have alerted his physician to a potential problem.     Reflux no better with oral antacids except the calcium ones. Which may help to explain his elevated calcium. Has not been on proton pump inhibitor. Had an EGD but that was more than 6 years ago.   Social History  He  reports that he quit smoking about 4 years ago. He started smoking about  48 years ago. He has a 2.75 pack-year smoking history. He has never used smokeless tobacco. He reports previous alcohol use of about 10.0 standard drinks of alcohol per week. He reports current drug use. Drug: Marijuana.  Objective   Vital Signs        BP Readings from Last 1 Encounters:   11/12/21 100/62     Wt Readings from Last 3 Encounters:   02/09/22 66.2 kg (146 lb)   11/12/21 70.8 kg (156 lb)   11/06/20 74.3 kg (163 lb 11.2 oz)   Body mass index is 22.2 kg/m².     Physical Exam  Vitals reviewed.   Constitutional:       Appearance: Normal appearance. He is well-developed.   Neurological:      Mental Status: He is alert.   Psychiatric:         Behavior: Behavior normal.         Thought Content: Thought content normal.         Judgment: Judgment normal.

## 2022-03-02 ENCOUNTER — HOSPITAL ENCOUNTER (OUTPATIENT)
Dept: ULTRASOUND IMAGING | Facility: HOSPITAL | Age: 67
Discharge: HOME | End: 2022-03-02
Admitting: FAMILY MEDICINE

## 2022-03-02 DIAGNOSIS — Z82.49 FAMILY HISTORY OF ABDOMINAL AORTIC ANEURYSM: ICD-10-CM

## 2022-03-02 PROCEDURE — 76706 US ABDL AORTA SCREEN AAA: CPT

## 2022-05-09 DIAGNOSIS — E78.2 MIXED HYPERLIPIDEMIA: ICD-10-CM

## 2022-05-09 RX ORDER — SIMVASTATIN 40 MG
40 TABLET ORAL NIGHTLY
Qty: 90 TABLET | Refills: 2 | Status: SHIPPED | OUTPATIENT
Start: 2022-05-09 | End: 2022-12-02 | Stop reason: SDUPTHER

## 2022-08-03 ENCOUNTER — TELEPHONE (OUTPATIENT)
Dept: FAMILY MEDICINE CLINIC | Facility: CLINIC | Age: 67
End: 2022-08-03

## 2022-08-03 NOTE — TELEPHONE ENCOUNTER
Patient called to schedule physical appointment in November, asking for refill on Hyoscyamine 0.125mg called to [facility] [phone]

## 2022-11-09 DIAGNOSIS — E78.1 PURE HYPERTRIGLYCERIDEMIA: Primary | ICD-10-CM

## 2022-11-09 DIAGNOSIS — Z79.899 HIGH RISK MEDICATION USE: ICD-10-CM

## 2022-11-09 DIAGNOSIS — E78.2 MIXED HYPERLIPIDEMIA: ICD-10-CM

## 2022-11-09 DIAGNOSIS — Z13.1 DIABETES MELLITUS SCREENING: ICD-10-CM

## 2022-11-10 ENCOUNTER — LAB (OUTPATIENT)
Dept: FAMILY MEDICINE CLINIC | Facility: CLINIC | Age: 67
End: 2022-11-10

## 2022-11-10 ENCOUNTER — TELEPHONE (OUTPATIENT)
Dept: FAMILY MEDICINE CLINIC | Facility: CLINIC | Age: 67
End: 2022-11-10

## 2022-11-10 DIAGNOSIS — Z79.899 HIGH RISK MEDICATION USE: ICD-10-CM

## 2022-11-10 DIAGNOSIS — Z13.1 DIABETES MELLITUS SCREENING: ICD-10-CM

## 2022-11-10 DIAGNOSIS — E78.2 MIXED HYPERLIPIDEMIA: ICD-10-CM

## 2022-11-10 DIAGNOSIS — Z12.5 PROSTATE CANCER SCREENING: Primary | ICD-10-CM

## 2022-11-11 LAB
ALBUMIN SERPL-MCNC: 4.5 G/DL (ref 3.5–5.2)
ALBUMIN/GLOB SERPL: 2.3 G/DL
ALP SERPL-CCNC: 44 U/L (ref 39–117)
ALT SERPL-CCNC: 14 U/L (ref 1–41)
AST SERPL-CCNC: 20 U/L (ref 1–40)
BASOPHILS # BLD AUTO: 0.04 10*3/MM3 (ref 0–0.2)
BASOPHILS NFR BLD AUTO: 1.2 % (ref 0–1.5)
BILIRUB SERPL-MCNC: 0.5 MG/DL (ref 0–1.2)
BUN SERPL-MCNC: 14 MG/DL (ref 8–23)
BUN/CREAT SERPL: 17.5 (ref 7–25)
CALCIUM SERPL-MCNC: 10.4 MG/DL (ref 8.6–10.5)
CHLORIDE SERPL-SCNC: 103 MMOL/L (ref 98–107)
CHOLEST SERPL-MCNC: 189 MG/DL (ref 0–200)
CHOLEST/HDLC SERPL: 2.52 {RATIO}
CO2 SERPL-SCNC: 30.4 MMOL/L (ref 22–29)
CREAT SERPL-MCNC: 0.8 MG/DL (ref 0.76–1.27)
EGFRCR SERPLBLD CKD-EPI 2021: 97.6 ML/MIN/1.73
EOSINOPHIL # BLD AUTO: 0.09 10*3/MM3 (ref 0–0.4)
EOSINOPHIL NFR BLD AUTO: 2.6 % (ref 0.3–6.2)
ERYTHROCYTE [DISTWIDTH] IN BLOOD BY AUTOMATED COUNT: 12 % (ref 12.3–15.4)
GLOBULIN SER CALC-MCNC: 2 GM/DL
GLUCOSE SERPL-MCNC: 89 MG/DL (ref 65–99)
HCT VFR BLD AUTO: 42.5 % (ref 37.5–51)
HDLC SERPL-MCNC: 75 MG/DL (ref 40–60)
HGB BLD-MCNC: 13.9 G/DL (ref 13–17.7)
IMM GRANULOCYTES # BLD AUTO: 0.01 10*3/MM3 (ref 0–0.05)
IMM GRANULOCYTES NFR BLD AUTO: 0.3 % (ref 0–0.5)
LDLC SERPL CALC-MCNC: 89 MG/DL (ref 0–100)
LYMPHOCYTES # BLD AUTO: 1.59 10*3/MM3 (ref 0.7–3.1)
LYMPHOCYTES NFR BLD AUTO: 46.6 % (ref 19.6–45.3)
MCH RBC QN AUTO: 32 PG (ref 26.6–33)
MCHC RBC AUTO-ENTMCNC: 32.7 G/DL (ref 31.5–35.7)
MCV RBC AUTO: 97.7 FL (ref 79–97)
MONOCYTES # BLD AUTO: 0.25 10*3/MM3 (ref 0.1–0.9)
MONOCYTES NFR BLD AUTO: 7.3 % (ref 5–12)
NEUTROPHILS # BLD AUTO: 1.43 10*3/MM3 (ref 1.7–7)
NEUTROPHILS NFR BLD AUTO: 42 % (ref 42.7–76)
NRBC BLD AUTO-RTO: 0 /100 WBC (ref 0–0.2)
PLATELET # BLD AUTO: 188 10*3/MM3 (ref 140–450)
POTASSIUM SERPL-SCNC: 4.5 MMOL/L (ref 3.5–5.2)
PROT SERPL-MCNC: 6.5 G/DL (ref 6–8.5)
PSA SERPL-MCNC: 1.75 NG/ML (ref 0–4)
RBC # BLD AUTO: 4.35 10*6/MM3 (ref 4.14–5.8)
SODIUM SERPL-SCNC: 142 MMOL/L (ref 136–145)
TRIGL SERPL-MCNC: 148 MG/DL (ref 0–150)
VLDLC SERPL CALC-MCNC: 25 MG/DL (ref 5–40)
WBC # BLD AUTO: 3.41 10*3/MM3 (ref 3.4–10.8)

## 2022-11-14 ENCOUNTER — OFFICE VISIT (OUTPATIENT)
Dept: FAMILY MEDICINE CLINIC | Facility: CLINIC | Age: 67
End: 2022-11-14

## 2022-11-14 VITALS
HEART RATE: 72 BPM | DIASTOLIC BLOOD PRESSURE: 62 MMHG | SYSTOLIC BLOOD PRESSURE: 128 MMHG | BODY MASS INDEX: 23.19 KG/M2 | WEIGHT: 153 LBS | RESPIRATION RATE: 18 BRPM | HEIGHT: 68 IN

## 2022-11-14 DIAGNOSIS — R19.4 CHANGE IN BOWEL HABITS: ICD-10-CM

## 2022-11-14 DIAGNOSIS — Z00.00 HEALTHCARE MAINTENANCE: Primary | ICD-10-CM

## 2022-11-14 DIAGNOSIS — K58.2 IRRITABLE BOWEL SYNDROME WITH BOTH CONSTIPATION AND DIARRHEA: ICD-10-CM

## 2022-11-14 DIAGNOSIS — M54.50 CHRONIC BILATERAL LOW BACK PAIN WITHOUT SCIATICA: ICD-10-CM

## 2022-11-14 DIAGNOSIS — G89.29 CHRONIC BILATERAL LOW BACK PAIN WITHOUT SCIATICA: ICD-10-CM

## 2022-11-14 DIAGNOSIS — Z23 NEED FOR VACCINATION: ICD-10-CM

## 2022-11-14 PROCEDURE — 99397 PER PM REEVAL EST PAT 65+ YR: CPT | Performed by: FAMILY MEDICINE

## 2022-11-14 NOTE — PROGRESS NOTES
"Assessment and Plan     Patient Instructions    Problem List Items Addressed This Visit    None  Visit Diagnoses     Healthcare maintenance    -  Primary    Need for vaccination        Change in bowel habits        Irritable bowel syndrome with both constipation and diarrhea        Relevant Orders    Ambulatory Referral to Gastroenterology    Chronic bilateral low back pain without sciatica                 Return in about 1 year (around 11/14/2023).        Seth is a 67 y.o. being seen today for  Annual Exam   Subjective   History of the Present Illness   Seth Leen 67 y.o. male who presents for yearly preventive exam.  He exercises not enough --- has been very busy at work.  Last colonoscopy: colonoscopy 4 years ago with abnormalities.  Immunizations: not up to date - pneumonia. We are out of the vaccine today.   PSA was discussed and ordered He has no increased risk of prostate cancer  He does see his dentist regularly  His diet is in general, a \"healthy\" diet    He describes his alcohol intake as none  COVID vaccine UTD? yes  His cardiovascular risk is: LDL goal is under 130  This patient has ever been tested for HepC: yes   He has lots of abdominal discomfort. Has purposely lost ten pounds. He gurgles but it is not painful. He has anxiety dreams from the gurgling. Protonix didn't help.   Using minoxidil.     He continues to have low back pain as well. Has never been given any exercises to help with it.   Review of Systems   HENT:        Nasal congestion and difficulty smelling -- going off and on for awhile   Gastrointestinal:        Gurgling   Musculoskeletal: Positive for back pain (persistent--comes and goes. ).      Social History  He  reports that he quit smoking about 4 years ago. He started smoking about 48 years ago. He has a 2.75 pack-year smoking history. He has never used smokeless tobacco. He reports that he does not currently use alcohol after a past usage of about 10.0 standard drinks per " week. He reports current drug use. Drug: Marijuana.  Objective   Vital Signs        BP Readings from Last 1 Encounters:   11/14/22 128/62     Wt Readings from Last 3 Encounters:   11/14/22 69.4 kg (153 lb)   02/09/22 66.2 kg (146 lb)   11/12/21 70.8 kg (156 lb)   Body mass index is 23.26 kg/m².     Physical Exam  Vitals reviewed.   Constitutional:       General: He is not in acute distress.     Appearance: He is well-developed.   HENT:      Head: Normocephalic and atraumatic.      Right Ear: Tympanic membrane, ear canal and external ear normal.      Left Ear: Tympanic membrane, ear canal and external ear normal.      Mouth/Throat:      Comments: Mask in place  Eyes:      Conjunctiva/sclera: Conjunctivae normal.   Neck:      Thyroid: No thyromegaly.   Cardiovascular:      Rate and Rhythm: Normal rate and regular rhythm.      Pulses:           Dorsalis pedis pulses are 1+ on the right side and 1+ on the left side.        Posterior tibial pulses are 1+ on the right side and 1+ on the left side.      Heart sounds: Normal heart sounds. No murmur heard.  Pulmonary:      Effort: Pulmonary effort is normal. No respiratory distress.      Breath sounds: Normal breath sounds. No wheezing or rales.   Abdominal:      General: There is no distension.      Palpations: Abdomen is soft. There is no mass.      Tenderness: There is no abdominal tenderness.   Musculoskeletal:         General: No deformity. Normal range of motion.      Cervical back: Neck supple.   Lymphadenopathy:      Cervical: No cervical adenopathy.   Skin:     General: Skin is warm and dry.   Neurological:      Mental Status: He is alert and oriented to person, place, and time.   Psychiatric:         Behavior: Behavior normal.         Thought Content: Thought content normal.         Judgment: Judgment normal.               Patient was given instructions and counseling regarding his condition or for health maintenance advice. Please see specific information pulled  into the AVS if appropriate.

## 2022-12-02 ENCOUNTER — CLINICAL SUPPORT (OUTPATIENT)
Dept: FAMILY MEDICINE CLINIC | Facility: CLINIC | Age: 67
End: 2022-12-02

## 2022-12-02 ENCOUNTER — TELEPHONE (OUTPATIENT)
Dept: FAMILY MEDICINE CLINIC | Facility: CLINIC | Age: 67
End: 2022-12-02

## 2022-12-02 DIAGNOSIS — Z23 NEED FOR VACCINATION: Primary | ICD-10-CM

## 2022-12-02 DIAGNOSIS — E78.2 MIXED HYPERLIPIDEMIA: ICD-10-CM

## 2022-12-02 DIAGNOSIS — F52.21 ED (ERECTILE DYSFUNCTION) OF NON-ORGANIC ORIGIN: ICD-10-CM

## 2022-12-02 PROCEDURE — 90677 PCV20 VACCINE IM: CPT | Performed by: FAMILY MEDICINE

## 2022-12-02 PROCEDURE — 90471 IMMUNIZATION ADMIN: CPT | Performed by: FAMILY MEDICINE

## 2022-12-02 RX ORDER — SIMVASTATIN 40 MG
40 TABLET ORAL NIGHTLY
Qty: 90 TABLET | Refills: 2 | Status: SHIPPED | OUTPATIENT
Start: 2022-12-02

## 2022-12-02 RX ORDER — SILDENAFIL CITRATE 20 MG/1
20 TABLET ORAL SEE ADMIN INSTRUCTIONS
Qty: 90 TABLET | Refills: 3 | Status: SHIPPED | OUTPATIENT
Start: 2022-12-02

## 2022-12-02 NOTE — TELEPHONE ENCOUNTER
Patient was here for pneumonia shot and asked about refill for his simvastatin and slidenafil. Patient has updated pharmacy to [facility] [address]. Patient states he will be out of these in the next few weeks, but wanted to make sure they went to his new pharmacy.     Last OV: 11/14/22  Labs: 11/10/22

## 2022-12-13 ENCOUNTER — OFFICE VISIT (OUTPATIENT)
Dept: GASTROENTEROLOGY | Facility: CLINIC | Age: 67
End: 2022-12-13

## 2022-12-13 VITALS
DIASTOLIC BLOOD PRESSURE: 74 MMHG | WEIGHT: 153.2 LBS | OXYGEN SATURATION: 98 % | HEIGHT: 68 IN | BODY MASS INDEX: 23.22 KG/M2 | SYSTOLIC BLOOD PRESSURE: 118 MMHG | HEART RATE: 59 BPM | TEMPERATURE: 96.6 F

## 2022-12-13 DIAGNOSIS — R19.8 BORBORYGMI: ICD-10-CM

## 2022-12-13 DIAGNOSIS — K59.04 CHRONIC IDIOPATHIC CONSTIPATION: Primary | ICD-10-CM

## 2022-12-13 DIAGNOSIS — Z86.010 HISTORY OF ADENOMATOUS POLYP OF COLON: ICD-10-CM

## 2022-12-13 PROCEDURE — 99203 OFFICE O/P NEW LOW 30 MIN: CPT | Performed by: INTERNAL MEDICINE

## 2022-12-13 RX ORDER — POLYETHYLENE GLYCOL 3350 17 G/17G
17 POWDER, FOR SOLUTION ORAL DAILY
Qty: 578 G | Refills: 11 | Status: SHIPPED | OUTPATIENT
Start: 2022-12-13

## 2022-12-13 NOTE — PROGRESS NOTES
Chief Complaint   Patient presents with   • Heartburn     Seth Montgomery is a 67 y.o. male who presents with a history of colon polyps with borborygmi and constipation  HPI     Patient 67-year-old male with history of hyperlipidemia, diverticulitis as well as colon polyps on his last colonoscopy 2018 reporting increased gurgling and constipation noted.  Patient reports alternating bowels with initially small stones of stool followed the next day with a hard large stool followed by normal to soft stools then cycle back again after a day or 2 with no stool.  Patient reports the noise from his abdomen can actually wake him up at night.  Patient has been on fiber products but continues have issues.  Patient denies any bright red blood per rectum and no recurrent diverticulitis to date.  Patient here for further recommendations.    Past Medical History:   Diagnosis Date   • Anxiety    • Depression    • Diverticula of colon    • Diverticulitis 2005   • Fasciculation    • H/O esophageal reflux    • H/O Fasciculations    • History of foreign travel     [facility]   • Hyperlipidemia    • Leukocytopenia 6/23/2017   • Loss of hearing        Current Outpatient Medications:   •  Calcium Carbonate Antacid (ANTACID PO), Take 1 tablet by mouth As Needed., Disp: , Rfl:   •  hyoscyamine (LEVSIN) 0.125 MG SL tablet, Place 1 tablet under the tongue Every 4 (Four) Hours As Needed., Disp: , Rfl:   •  Ibuprofen (ADVIL PO), Take 1 tablet by mouth As Needed., Disp: , Rfl:   •  Nicotine Polacrilex (NICORETTE MT), Apply  to the mouth or throat As Needed., Disp: , Rfl:   •  Probiotic Product (PROBIOTIC DAILY PO), Take  by mouth Daily., Disp: , Rfl:   •  sildenafil (REVATIO) 20 MG tablet, Take 1 tablet by mouth See Admin Instructions., Disp: 90 tablet, Rfl: 3  •  simvastatin (ZOCOR) 40 MG tablet, Take 1 tablet by mouth Every Night., Disp: 90 tablet, Rfl: 2  •  pantoprazole (Protonix) 40 MG EC tablet, Take 1 tablet by mouth  Daily., Disp: 90 tablet, Rfl: 1  •  polyethylene glycol (MIRALAX) 17 GM/SCOOP powder, Take 17 g by mouth Daily., Disp: 578 g, Rfl: 11  No Known Allergies  Social History     Socioeconomic History   • Marital status:    • Years of education: College   Tobacco Use   • Smoking status: Former     Packs/day: 0.50     Years: 5.50     Pack years: 2.75     Types: Cigarettes     Start date:      Quit date: 2018     Years since quittin.9   • Smokeless tobacco: Never   • Tobacco comments:     intermittently smokes, up to 1 PPD, but has quit several times   Substance and Sexual Activity   • Alcohol use: Not Currently     Alcohol/week: 10.0 standard drinks     Types: 10 Glasses of wine per week     Comment: 2 glasses wine (1/2 bottle) per day   • Drug use: Yes     Types: Marijuana   • Sexual activity: Defer     Family History   Problem Relation Age of Onset   • Transient ischemic attack Mother    • Breast cancer Mother 75   • Von Willebrand disease Mother    • Hypertension Father    • Heart disease Father    • Von Willebrand disease Sister    • Ovarian cancer Maternal Grandmother 62   • Coronary artery disease Brother    • Aortic aneurysm Brother         descending     Review of Systems   Constitutional: Negative.    HENT: Negative.    Eyes: Negative.    Respiratory: Negative.    Cardiovascular: Negative.    Gastrointestinal: Positive for constipation. Negative for abdominal distention, abdominal pain, anal bleeding, blood in stool, diarrhea, nausea, rectal pain and vomiting.   Endocrine: Negative.    Musculoskeletal: Negative.    Skin: Negative.    Allergic/Immunologic: Negative.    Hematological: Negative.      Vitals:    22 1033   BP: 118/74   Pulse: 59   Temp: 96.6 °F (35.9 °C)   SpO2: 98%     Physical Exam  Vitals and nursing note reviewed.   Constitutional:       Appearance: Normal appearance. He is well-developed and normal weight.   HENT:      Head: Normocephalic and atraumatic.   Eyes:       General: No scleral icterus.     Conjunctiva/sclera: Conjunctivae normal.   Neck:      Trachea: No tracheal deviation.   Cardiovascular:      Rate and Rhythm: Normal rate and regular rhythm.      Heart sounds: No murmur heard.    No friction rub. No gallop.   Pulmonary:      Effort: Pulmonary effort is normal. No respiratory distress.      Breath sounds: Normal breath sounds. No wheezing or rales.   Abdominal:      General: Bowel sounds are normal. There is no distension.      Palpations: Abdomen is soft. There is no mass.      Tenderness: There is no abdominal tenderness. There is no guarding or rebound.   Musculoskeletal:         General: No swelling or tenderness. Normal range of motion.      Cervical back: Normal range of motion and neck supple. No rigidity.   Skin:     General: Skin is warm and dry.      Coloration: Skin is not jaundiced.      Findings: No rash.   Neurological:      General: No focal deficit present.      Mental Status: He is alert and oriented to person, place, and time.      Cranial Nerves: No cranial nerve deficit.   Psychiatric:         Behavior: Behavior normal.         Thought Content: Thought content normal.         Judgment: Judgment normal.       Diagnoses and all orders for this visit:    Chronic idiopathic constipation    Borborygmi    History of adenomatous polyp of colon    Other orders  -     Probiotic Product (PROBIOTIC DAILY PO); Take  by mouth Daily.  -     polyethylene glycol (MIRALAX) 17 GM/SCOOP powder; Take 17 g by mouth Daily.    Patient 67-year-old male with history of hyperlipidemia anxiety with episodes of diverticulitis in the past presenting with constipation and borborygmi.  Patient had mentioned about heartburn but actually reports his symptom is gurgling in his stomach consistent with borborygmi.  Patient denies any classic heartburn or reflux.  Patient reports the borborygmi will awaken him at night at times improved with bowel movements.  His bowels though  typically are alternating between pebble-like stools followed like large plug like bowel movements he describes.  Patient will then have a large more normal stool then may have some loose stools or none at all for a couple of days followed again by the cycle.  At this point clinically sounds like we need to avoid the constipation in order to avoid the following pendulum of his bowel movements.  We will begin with MiraLAX 17 g daily and adjust for effect.  Patient due for follow-up colonoscopy October 2023.

## 2023-02-21 ENCOUNTER — TELEPHONE (OUTPATIENT)
Dept: FAMILY MEDICINE CLINIC | Facility: CLINIC | Age: 68
End: 2023-02-21
Payer: COMMERCIAL

## 2023-02-21 DIAGNOSIS — Z87.891 FORMER SMOKER: ICD-10-CM

## 2023-02-21 DIAGNOSIS — R05.9 COUGH, UNSPECIFIED TYPE: Primary | ICD-10-CM

## 2023-02-21 NOTE — TELEPHONE ENCOUNTER
Pt called asking for a CXR for a low dose CT scan due to the fact he is an ex-smoker and he is also having a dry cough at night. Please advise

## 2023-02-22 ENCOUNTER — HOSPITAL ENCOUNTER (OUTPATIENT)
Dept: GENERAL RADIOLOGY | Facility: HOSPITAL | Age: 68
Discharge: HOME | End: 2023-02-22
Admitting: FAMILY MEDICINE
Payer: COMMERCIAL

## 2023-02-22 DIAGNOSIS — R05.9 COUGH, UNSPECIFIED TYPE: ICD-10-CM

## 2023-02-22 DIAGNOSIS — Z87.891 FORMER SMOKER: ICD-10-CM

## 2023-02-22 PROCEDURE — 71046 X-RAY EXAM CHEST 2 VIEWS: CPT

## 2023-03-31 ENCOUNTER — TELEPHONE (OUTPATIENT)
Dept: FAMILY MEDICINE CLINIC | Facility: CLINIC | Age: 68
End: 2023-03-31

## 2023-03-31 ENCOUNTER — OFFICE VISIT (OUTPATIENT)
Dept: FAMILY MEDICINE CLINIC | Facility: CLINIC | Age: 68
End: 2023-03-31
Payer: COMMERCIAL

## 2023-03-31 VITALS
BODY MASS INDEX: 23.34 KG/M2 | WEIGHT: 154 LBS | HEART RATE: 66 BPM | RESPIRATION RATE: 18 BRPM | DIASTOLIC BLOOD PRESSURE: 68 MMHG | HEIGHT: 68 IN | SYSTOLIC BLOOD PRESSURE: 110 MMHG

## 2023-03-31 DIAGNOSIS — M25.561 PAIN OF RIGHT PATELLA: Primary | ICD-10-CM

## 2023-03-31 PROCEDURE — 99212 OFFICE O/P EST SF 10 MIN: CPT | Performed by: FAMILY MEDICINE

## 2023-03-31 NOTE — TELEPHONE ENCOUNTER
Was here today and forgot to ask you about his chest xray from February of this year.  Would like someone to call him

## 2023-03-31 NOTE — PROGRESS NOTES
"Assessment and Plan     Patient Instructions    Problem List Items Addressed This Visit    None  Visit Diagnoses     Pain of right patella    -  Primary    Relevant Orders    Ambulatory Referral to Sports Medicine                     Seth is a 67 y.o. being seen today for  Knee Pain (Right for 6 months off and on )   Subjective   History of the Present Illness   Right knee pain, under the right knee cap. Hurts to go down the basement steps. Can feel it if he climbs a hill. Not debilitating. Can ski ok but only with \"skating\". When he got on his bike he was getting pain in it. Then it stopped 12 way in. He doesn't want it to interfere with his skiing next summer. Did have a soccer injury many years ago.   Social History  He  reports that he quit smoking about 5 years ago. He started smoking about 49 years ago. He has a 2.75 pack-year smoking history. He has never used smokeless tobacco. He reports that he does not currently use alcohol after a past usage of about 10.0 standard drinks per week. He reports current drug use. Drug: Marijuana.  Objective   Vital Signs        BP Readings from Last 1 Encounters:   03/31/23 110/68     Wt Readings from Last 3 Encounters:   03/31/23 69.9 kg (154 lb)   12/13/22 69.5 kg (153 lb 3.2 oz)   11/14/22 69.4 kg (153 lb)   Body mass index is 23.42 kg/m².     Physical Exam  Vitals reviewed.   Constitutional:       Appearance: Normal appearance. He is well-developed.   Musculoskeletal:         General: No swelling, tenderness, deformity or signs of injury. Normal range of motion.   Neurological:      Mental Status: He is alert.   Psychiatric:         Behavior: Behavior normal.         Thought Content: Thought content normal.         Judgment: Judgment normal.               Patient was given instructions and counseling regarding his condition or for health maintenance advice. Please see specific information pulled into the AVS if appropriate.    "

## 2023-05-04 ENCOUNTER — OFFICE VISIT (OUTPATIENT)
Dept: ORTHOPEDIC SURGERY | Facility: CLINIC | Age: 68
End: 2023-05-04
Payer: COMMERCIAL

## 2023-05-04 VITALS
SYSTOLIC BLOOD PRESSURE: 102 MMHG | BODY MASS INDEX: 22.28 KG/M2 | HEIGHT: 68 IN | DIASTOLIC BLOOD PRESSURE: 56 MMHG | WEIGHT: 147 LBS

## 2023-05-04 DIAGNOSIS — M25.561 RIGHT KNEE PAIN, UNSPECIFIED CHRONICITY: Primary | ICD-10-CM

## 2023-05-04 DIAGNOSIS — M22.2X2 PATELLOFEMORAL PAIN SYNDROME OF BOTH KNEES: ICD-10-CM

## 2023-05-04 DIAGNOSIS — M22.2X1 PATELLOFEMORAL PAIN SYNDROME OF BOTH KNEES: ICD-10-CM

## 2023-05-04 DIAGNOSIS — M25.562 LEFT KNEE PAIN, UNSPECIFIED CHRONICITY: ICD-10-CM

## 2023-05-04 PROCEDURE — 99202 OFFICE O/P NEW SF 15 MIN: CPT | Performed by: ORTHOPAEDIC SURGERY

## 2023-05-04 NOTE — PROGRESS NOTES
Subjective:     Patient ID: Seth Montgomery is a 68 y.o. male.    Chief Complaint:  Bilateral knee pain, new patient    History of Present Illness  Seth Montgomery presents to the clinic for evaluation of anterior knee pain, right greater than left. He has been experiencing intermittent pain for approximately 6 to 8 months. He rates his pain 3 to 4 out of 10, primarily with deep flexion activities. He denies any specific injury prior to the onset of his pain or any arnav locking or catching of his knee. He experiences mild intermittent swelling, primarily on the right knee, exacerbated with climbing stairs and prolonged ambulation activities. He notes mild improvement with ice and bracing. He denies any numbness, tingling, fevers, chills, sweats or any significant radiation of his pain down the level of the patella. He denies any medial or lateral joint line pain. He denies any hip or groin pain at this time.    He injured his right knee while playing soccer approximately 10 years ago and was diagnosed with a torn MCL at that time.       Social History     Occupational History   • Occupation:      Comment: [employer]   Tobacco Use   • Smoking status: Former     Packs/day: 0.50     Years: 5.50     Pack years: 2.75     Types: Cigarettes     Start date:      Quit date: 2018     Years since quittin.3     Passive exposure: Never   • Smokeless tobacco: Never   • Tobacco comments:     intermittently smokes, up to 1 PPD, but has quit several times   Vaping Use   • Vaping Use: Never used   Substance and Sexual Activity   • Alcohol use: Not Currently     Alcohol/week: 10.0 standard drinks     Types: 10 Glasses of wine per week     Comment: 2 glasses wine (1/2 bottle) per day   • Drug use: Yes     Types: Marijuana   • Sexual activity: Defer      Past Medical History:   Diagnosis Date   • Anxiety    • Depression    • Diverticula of colon    • Diverticulitis    • Fasciculation    • H/O esophageal  "reflux    • H/O Fasciculations    • History of foreign travel     [facility]   • Hyperlipidemia    • Leukocytopenia 6/23/2017   • Loss of hearing      Past Surgical History:   Procedure Laterality Date   • COLONOSCOPY N/A 10/22/2018    Procedure: COLONOSCOPY to cecum and TI:  cold polypectomies;  Surgeon: Fredi Mirza MD;  Location: [facility] ENDOSCOPY;  Service: Gastroenterology   • COLONOSCOPY W/ POLYPECTOMY N/A 01/31/2014    One 6 mm polyp in the transverse colon (path: hyperplastic), one 2 mm polyp in the sigmoid colon (path: hyperplastic), diverticulosis in the sigmoid colon, non-bleeding internal hemorrhoids, normal ileum-Dr. Fredi Mirza   • COLONOSCOPY W/ POLYPECTOMY N/A 01/04/2007    Normal terminal ileum, two 18 mm polyps in the mid ascending colon (path: hyperplastic)-Dr. Fredi Mirza   • HERNIA REPAIR     • UPPER GASTROINTESTINAL ENDOSCOPY N/A 08/06/2010    LA Grade A reflux esophagitis, z-line irregular at the GE junction, hiatus hernia, gastric mucosal abnormality characterized by erythema, normal duodenum-Dr. Fredi Mirza       Family History   Problem Relation Age of Onset   • Transient ischemic attack Mother    • Breast cancer Mother 75   • Von Willebrand disease Mother    • Hypertension Father    • Heart disease Father    • Von Willebrand disease Sister    • Ovarian cancer Maternal Grandmother 62   • Coronary artery disease Brother    • Aortic aneurysm Brother         descending         Review of Systems        Objective:  Vitals:    05/04/23 1434   BP: 102/56   BP Location: Right arm   Weight: 66.7 kg (147 lb)   Height: 172.7 cm (68\")         05/04/23  1434   Weight: 66.7 kg (147 lb)     Body mass index is 22.35 kg/m².  Physical Exam    Vital signs reviewed.   General: No acute distress, alert and oriented  Eyes: conjunctiva clear; pupils equally round and reactive  ENT: external ears and nose atraumatic; oropharynx clear  CV: no peripheral edema  Resp: normal " respiratory effort  Skin: no rashes or wounds; normal turgor  Psych: mood and affect appropriate; recent and remote memory intact          Ortho Exam       Bilateral Knee:     Active range of motion is 0 to 135 degrees.  Flexion strength- 4+/5.  Extension strength- 4+/5.  No joint line pain.  Mich's exam- Negative.  Effusion- Mild.  Grade 1A Lachman.  Anterior drawer- Negative.  Posterior drawer- Negative.  Stable to varus and valgus stress at 0 and 30 degrees.  Active patellar compression test- Significantly positive, right greater than left.  Midline patellar tracking.  No inverted sign.  Patellar apprehension test- Negative.  Log roll test- No hip pain.  Stinchfield's test- No hip pain.  Straight leg raise- Negative, bilateral lower extremities.  Positive sensation light touch all distributions symmetric to contralateral side.  Brisk cap refill all digits, 2+ dorsalis pedis pulse.    Imaging:  Bilateral Knee X-Ray  Indication: Pain    AP, Lateral, and Nikolai views    Findings:  No fracture  No bony lesion  Normal soft tissues  Normal joint spaces     No prior studies were available for comparison.    Assessment:        1. Right knee pain, unspecified chronicity    2. Left knee pain, unspecified chronicity           Plan:        1. Discussed treatment options at length with patient at today's visit.  2. At this point in time, his pain seems to be coming mostly patellofemoral in origin. We did review extensively his x-rays from today. His alignment looks acceptable. He has good joint space both medial and lateral compartments and minimal narrowing of his patellofemoral joint. 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 We did discuss option for advanced imaging, but we will hold off on that at this time.  3. He is going to try to implement some supplements including glucosamine and chondroitin as well as turmeric.  4. We may proceed to injection  options including but not limited to viscosupplement or PRP if having issues.  5. I will plan to follow up with him on an as needed basis, particularly if he has any recurrence or exacerbation of symptoms.        Seth Stewardnadia was in agreement with plan and had all questions answered.     Orders:  Orders Placed This Encounter   Procedures   • XR Knee 3 View Bilateral       Medications:  No orders of the defined types were placed in this encounter.      Followup:  No follow-ups on file.    Diagnoses and all orders for this visit:    1. Right knee pain, unspecified chronicity (Primary)  -     XR Knee 3 View Bilateral    2. Left knee pain, unspecified chronicity  -     XR Knee 3 View Bilateral    Other orders  -     Cancel: XR Knee 3 View Right          Transcribed from ambient dictation for Jelani Yeager MD by Ivet Villanueva.  05/04/23   18:51 EDT

## 2023-05-15 ENCOUNTER — OFFICE VISIT (OUTPATIENT)
Dept: FAMILY MEDICINE CLINIC | Facility: CLINIC | Age: 68
End: 2023-05-15
Payer: COMMERCIAL

## 2023-05-15 VITALS
WEIGHT: 151 LBS | BODY MASS INDEX: 22.88 KG/M2 | SYSTOLIC BLOOD PRESSURE: 100 MMHG | HEIGHT: 68 IN | DIASTOLIC BLOOD PRESSURE: 60 MMHG | HEART RATE: 60 BPM | RESPIRATION RATE: 18 BRPM

## 2023-05-15 DIAGNOSIS — R03.1 INCIDENTAL FINDING OF LOW BLOOD PRESSURE: Primary | ICD-10-CM

## 2023-05-15 PROCEDURE — 99212 OFFICE O/P EST SF 10 MIN: CPT | Performed by: FAMILY MEDICINE

## 2023-05-15 NOTE — PROGRESS NOTES
Assessment and Plan     Patient Instructions    Problem List Items Addressed This Visit    None  Visit Diagnoses     Normal finding of low blood pressure    -  Primary      Normal tongue    Recommend having dentist also check tongue.               Seth is a 68 y.o. being seen today for  bumps on tongue    Subjective   History of the Present Illness   Just noticed these after looking at his tongue following a conversation with a friend who apparently has had recent treatment for tongue cancer.    Also has noticed very low blood pressures over the last couple of blood pressure checks.  However on check of the synopsis this is really not new.  His systolic blood pressure can be anywhere from low 100s to 1 teens.  Social History  He  reports that he quit smoking about 5 years ago. His smoking use included cigarettes. He started smoking about 49 years ago. He has a 2.75 pack-year smoking history. He has never been exposed to tobacco smoke. He has never used smokeless tobacco. He reports that he does not currently use alcohol after a past usage of about 10.0 standard drinks per week. He reports current drug use. Drug: Marijuana.  Objective   Vital Signs        BP Readings from Last 1 Encounters:   05/15/23 100/60     Wt Readings from Last 3 Encounters:   05/15/23 68.5 kg (151 lb)   05/04/23 66.7 kg (147 lb)   03/31/23 69.9 kg (154 lb)   Body mass index is 22.96 kg/m².     Physical Exam  Vitals reviewed.   Constitutional:       Appearance: Normal appearance. He is well-developed.   HENT:      Mouth/Throat:     Lymphadenopathy:      Cervical: No cervical adenopathy.   Neurological:      Mental Status: He is alert.   Psychiatric:         Behavior: Behavior normal.         Thought Content: Thought content normal.         Judgment: Judgment normal.                 Patient was given instructions and counseling regarding his condition or for health maintenance advice. Please see specific information pulled into the AVS if  appropriate.

## 2023-07-26 ENCOUNTER — TELEPHONE (OUTPATIENT)
Dept: GASTROENTEROLOGY | Facility: CLINIC | Age: 68
End: 2023-07-26

## 2023-07-26 NOTE — TELEPHONE ENCOUNTER
Hub staff attempted to follow warm transfer process and was unsuccessful     Caller: Seth Montgomery    Relationship to patient: Self    Best call back number: [phone]    Patient is needing: PATIENT NEEDING TO SCHEDULE COLONOSCOPY.  PLEASE REACH OUT TO SCHEDULE. THANK YOU

## 2023-08-03 ENCOUNTER — TELEPHONE (OUTPATIENT)
Dept: FAMILY MEDICINE CLINIC | Facility: CLINIC | Age: 68
End: 2023-08-03
Payer: COMMERCIAL

## 2023-08-13 ENCOUNTER — PREP FOR SURGERY (OUTPATIENT)
Dept: OTHER | Facility: HOSPITAL | Age: 68
End: 2023-08-13
Payer: COMMERCIAL

## 2023-08-13 DIAGNOSIS — Z86.010 HISTORY OF ADENOMATOUS POLYP OF COLON: Primary | ICD-10-CM

## 2023-08-21 ENCOUNTER — TELEPHONE (OUTPATIENT)
Dept: FAMILY MEDICINE CLINIC | Facility: CLINIC | Age: 68
End: 2023-08-21
Payer: COMMERCIAL

## 2023-08-21 NOTE — TELEPHONE ENCOUNTER
ANANT CM outreach with Patient to discuss Chronic Care Management. Left a voice mail requesting a return call to Angus Rowe Mgr at [phone].   Pt called and said he was bitten by a cat 5 days ago and he think he may now need and antibiotic. Please call him back at [phone]

## 2023-08-22 NOTE — PROGRESS NOTES
Assessment and Plan     Patient Instructions    Problem List Items Addressed This Visit    None  Visit Diagnoses       Cat bite, initial encounter    -  Primary    Relevant Medications    mupirocin (BACTROBAN) 2 % cream    amoxicillin-clavulanate (AUGMENTIN) 875-125 MG per tablet    Paronychia of right index finger        Relevant Medications    mupirocin (BACTROBAN) 2 % cream    amoxicillin-clavulanate (AUGMENTIN) 875-125 MG per tablet               Return if symptoms worsen or fail to improve.          Seth is a 68 y.o. being seen today for  Animal Bite (Cat bite)   Subjective   HPI  Bit by his own very old cat who was attempting to get to another cat underneath his deck.  Consider this an appropriate bite.  This occurred 5 days ago.  He now has some redness and swelling of the right index finger at the edge of the lateral mid nail.   Social History  He  reports that he quit smoking about 5 years ago. His smoking use included cigarettes. He started smoking about 49 years ago. He has a 2.75 pack-year smoking history. He has never been exposed to tobacco smoke. He has never used smokeless tobacco. He reports that he does not currently use alcohol after a past usage of about 10.0 standard drinks per week. He reports current drug use. Drug: Marijuana.  Objective   Vital Signs        BP Readings from Last 1 Encounters:   08/23/23 110/70     Wt Readings from Last 3 Encounters:   08/23/23 68.5 kg (151 lb)   06/28/23 68.5 kg (151 lb)   05/15/23 68.5 kg (151 lb)   Body mass index is 22.96 kg/mý.   Physical Exam  Examination of the fingers shows tenderness of distal index right lateral paronychial area w/mild swelling and erythema, remainder of finger, hand and wrist exam is normal.   BMI is within normal parameters. No other follow-up for BMI required.               Patient was given instructions and counseling regarding his condition or for health maintenance advice. Please see specific information pulled into the AVS  if appropriate.

## 2023-08-23 ENCOUNTER — OFFICE VISIT (OUTPATIENT)
Dept: FAMILY MEDICINE CLINIC | Facility: CLINIC | Age: 68
End: 2023-08-23
Payer: COMMERCIAL

## 2023-08-23 VITALS
DIASTOLIC BLOOD PRESSURE: 70 MMHG | SYSTOLIC BLOOD PRESSURE: 110 MMHG | RESPIRATION RATE: 16 BRPM | HEIGHT: 68 IN | HEART RATE: 58 BPM | WEIGHT: 151 LBS | OXYGEN SATURATION: 98 % | BODY MASS INDEX: 22.88 KG/M2
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
## 2023-08-23 DIAGNOSIS — L03.011 PARONYCHIA OF RIGHT INDEX FINGER: ICD-10-CM

## 2023-08-23 RX ORDER — AMOXICILLIN AND CLAVULANATE POTASSIUM 875; 125 MG/1; MG/1
1 TABLET, FILM COATED ORAL 2 TIMES DAILY
Qty: 14 TABLET | Refills: 0 | Status: SHIPPED | OUTPATIENT
Start: 2023-08-23 | End: 2023-08-30

## 2023-08-23 RX ORDER — MUPIROCIN CALCIUM 20 MG/G
1 CREAM TOPICAL 3 TIMES DAILY
Qty: 15 G | Refills: 0 | Status: SHIPPED | OUTPATIENT
Start: 2023-08-23

## 2023-08-28 ENCOUNTER — TELEPHONE (OUTPATIENT)
Dept: GASTROENTEROLOGY | Facility: CLINIC | Age: 68
End: 2023-08-28
Payer: COMMERCIAL

## 2023-08-30 ENCOUNTER — TELEPHONE (OUTPATIENT)
Dept: GASTROENTEROLOGY | Facility: CLINIC | Age: 68
End: 2023-08-30
Payer: COMMERCIAL

## 2023-08-30 PROBLEM — Z86.010 HISTORY OF ADENOMATOUS POLYP OF COLON: Status: ACTIVE | Noted: 2023-08-30

## 2023-08-30 PROBLEM — Z86.0101 HISTORY OF ADENOMATOUS POLYP OF COLON: Status: ACTIVE | Noted: 2023-08-30

## 2023-08-30 NOTE — TELEPHONE ENCOUNTER
OK FOR HUB TO READ  PT IS [facility] FOR COLONOSCOPY ON 11/22/23  PT WAS SENT MIRALAX PREP THROUGH [facility]  MICHELLE

## 2023-10-16 ENCOUNTER — TELEPHONE (OUTPATIENT)
Dept: GASTROENTEROLOGY | Facility: CLINIC | Age: 68
End: 2023-10-16
Payer: COMMERCIAL

## 2023-10-16 NOTE — TELEPHONE ENCOUNTER
Caller: Seth Montgomery    Relationship to patient: Self    Best call back number: [phone]    Patient is needing: PATIENT CALLED BACK IN AFTER SPEAKING WITH TIFFANIE AND SAID THAT HE WANTS TO KEEP HIS 11/22/23 COLONOSCOPY APPT AS HE SAID HE WAS TOLD THERE ARE NO OTHER APPTS SO HE WANTS TO MAKE SURE YOU ALL DON'T CANCEL THAT DATE OF 11/22/23 ON HIM. HE WILL KEEP THAT PROCEDURE DATE. PLEASE CALL PATIENT AND CONFIRM YOU HAVE RECEIVED THIS MESSAGE AND STILL HAVE HIM SCHEDULED FOR 11/2/23. YOU CAN LEAVE HIM A VMAIL.

## 2023-10-16 NOTE — TELEPHONE ENCOUNTER
Caller: Seth Montgomery    Relationship to patient: Self    Best call back number: [phone]    Patient is needing: PATIENT CALLED BACK IN AFTER SPEAKING WITH TIFFANIE AND SAID THAT HE WANTS TO KEEP HIS 11/22/23 COLONOSCOPY APPT AS HE SAID HE WAS TOLD THERE ARE NO OTHER APPTS SO HE WANTS TO MAKE SURE YOU ALL DON'T CANCEL THAT DATE OF 11/22/23 ON HIM. HE WILL KEEP THAT PROCEDURE DATE. PLEASE CALL PATIENT AND CONFIRM YOU HAVE RECEIVED THIS MESSAGE AND STILL HAVE HIM SCHEDULED FOR 11/2/23. YOU CAN LEAVE HIM A VMAIL.

## 2023-10-24 ENCOUNTER — TELEPHONE (OUTPATIENT)
Dept: FAMILY MEDICINE CLINIC | Facility: CLINIC | Age: 68
End: 2023-10-24
Payer: COMMERCIAL

## 2023-10-24 NOTE — TELEPHONE ENCOUNTER
Spoke to patient advised to let GI know this when he goes for colonoscopy they may be able to take care of the issue then

## 2023-10-24 NOTE — TELEPHONE ENCOUNTER
Patient has had a hemorrhoid for a few months that is becoming more bothersome. It is now bleeding with bowel movements. He notices red blood on the toilet paper when wiping. He reports stool is completely normal and nothing dark in color. He is scheduled for a colonoscopy 11/22/23 and isn't sure if he should see Dr. Mendoza for this or wait until he sees GI in a month. Please advise.

## 2023-10-30 ENCOUNTER — LAB (OUTPATIENT)
Dept: LAB | Facility: HOSPITAL | Age: 68
End: 2023-10-30
Payer: COMMERCIAL

## 2023-10-30 ENCOUNTER — TELEPHONE (OUTPATIENT)
Dept: GASTROENTEROLOGY | Facility: CLINIC | Age: 68
End: 2023-10-30
Payer: COMMERCIAL

## 2023-10-30 DIAGNOSIS — K62.5 RECTAL BLEEDING: Primary | ICD-10-CM

## 2023-10-30 LAB
DEPRECATED RDW RBC AUTO: 42 FL (ref 37–54)
ERYTHROCYTE [DISTWIDTH] IN BLOOD BY AUTOMATED COUNT: 12.1 % (ref 12.3–15.4)
HCT VFR BLD AUTO: 40 % (ref 37.5–51)
HGB BLD-MCNC: 13.6 G/DL (ref 13–17.7)
MCH RBC QN AUTO: 32.2 PG (ref 26.6–33)
MCHC RBC AUTO-ENTMCNC: 34 G/DL (ref 31.5–35.7)
MCV RBC AUTO: 94.8 FL (ref 79–97)
PLATELET # BLD AUTO: 201 10*3/MM3 (ref 140–450)
PMV BLD AUTO: 9.9 FL (ref 6–12)
RBC # BLD AUTO: 4.22 10*6/MM3 (ref 4.14–5.8)
WBC NRBC COR # BLD: 3.18 10*3/MM3 (ref 3.4–10.8)

## 2023-10-30 PROCEDURE — 36415 COLL VENOUS BLD VENIPUNCTURE: CPT | Performed by: NURSE PRACTITIONER

## 2023-10-30 PROCEDURE — 85027 COMPLETE CBC AUTOMATED: CPT | Performed by: NURSE PRACTITIONER

## 2023-10-30 RX ORDER — HYDROCORTISONE ACETATE 25 MG/1
25 SUPPOSITORY RECTAL NIGHTLY
Qty: 14 SUPPOSITORY | Refills: 0 | Status: SHIPPED | OUTPATIENT
Start: 2023-10-30 | End: 2023-11-13

## 2023-10-30 NOTE — TELEPHONE ENCOUNTER
Patient called. Advised as per Sandra's note. He verb understanding and will go have lab work drawn today.

## 2023-10-30 NOTE — TELEPHONE ENCOUNTER
Hub staff attempted to follow warm transfer process and was unsuccessful     Caller: Seth Montgomery    Relationship to patient: Self    Best call back number: [phone]    Patient is needing: PATIENT IS CALLING TO RELAY INFORMATION TO CLINICAL PRIOR TO UPCOMING PROCEDURE.  PATIENT WAS TOLD TO CONTACT  TO UPDATE THAT HE HAS A HEMORRHOID THAT BLEEDS WHEN HAVING A BOWEL MOVEMENT.   THANK YOU

## 2023-10-30 NOTE — TELEPHONE ENCOUNTER
Needs to come in for a CBC which I have ordered.  I sent Anusol suppositories to his pharmacy for relief until his colonoscopy. Never

## 2023-11-22 ENCOUNTER — ANESTHESIA (OUTPATIENT)
Dept: GASTROENTEROLOGY | Facility: HOSPITAL | Age: 68
End: 2023-11-22
Payer: COMMERCIAL

## 2023-11-22 ENCOUNTER — HOSPITAL ENCOUNTER (OUTPATIENT)
Facility: HOSPITAL | Age: 68
Setting detail: HOSPITAL OUTPATIENT SURGERY
Discharge: HOME | End: 2023-11-22
Attending: INTERNAL MEDICINE | Admitting: INTERNAL MEDICINE
Payer: COMMERCIAL

## 2023-11-22 ENCOUNTER — ANESTHESIA EVENT (OUTPATIENT)
Dept: GASTROENTEROLOGY | Facility: HOSPITAL | Age: 68
End: 2023-11-22
Payer: COMMERCIAL

## 2023-11-22 VITALS
HEART RATE: 61 BPM | BODY MASS INDEX: 22.58 KG/M2 | SYSTOLIC BLOOD PRESSURE: 90 MMHG | OXYGEN SATURATION: 98 % | RESPIRATION RATE: 15 BRPM | WEIGHT: 149 LBS | HEIGHT: 68 IN | DIASTOLIC BLOOD PRESSURE: 69 MMHG

## 2023-11-22 PROCEDURE — S0260 H&P FOR SURGERY: HCPCS | Performed by: INTERNAL MEDICINE

## 2023-11-22 PROCEDURE — 25010000002 PROPOFOL 10 MG/ML EMULSION: Performed by: REGISTERED NURSE

## 2023-11-22 PROCEDURE — 25810000003 LACTATED RINGERS PER 1000 ML: Performed by: INTERNAL MEDICINE

## 2023-11-22 PROCEDURE — 45378 DIAGNOSTIC COLONOSCOPY: CPT | Performed by: INTERNAL MEDICINE

## 2023-11-22 RX ORDER — ONDANSETRON 2 MG/ML
4 INJECTION INTRAMUSCULAR; INTRAVENOUS ONCE AS NEEDED
Status: DISCONTINUED | OUTPATIENT
Start: 2023-11-22 | End: 2023-11-22 | Stop reason: HOSPADM

## 2023-11-22 RX ORDER — SODIUM CHLORIDE, SODIUM LACTATE, POTASSIUM CHLORIDE, CALCIUM CHLORIDE 600; 310; 30; 20 MG/100ML; MG/100ML; MG/100ML; MG/100ML
30 INJECTION, SOLUTION INTRAVENOUS CONTINUOUS PRN
Status: DISCONTINUED | OUTPATIENT
Start: 2023-11-22 | End: 2023-11-22 | Stop reason: HOSPADM

## 2023-11-22 RX ORDER — PROPOFOL 10 MG/ML
VIAL (ML) INTRAVENOUS AS NEEDED
Status: DISCONTINUED | OUTPATIENT
Start: 2023-11-22 | End: 2023-11-22 | Stop reason: SURG

## 2023-11-22 RX ORDER — LIDOCAINE HYDROCHLORIDE 20 MG/ML
INJECTION, SOLUTION INFILTRATION; PERINEURAL AS NEEDED
Status: DISCONTINUED | OUTPATIENT
Start: 2023-11-22 | End: 2023-11-22 | Stop reason: SURG

## 2023-11-22 RX ADMIN — PROPOFOL 100 MG: 10 INJECTION, EMULSION INTRAVENOUS at 09:56

## 2023-11-22 RX ADMIN — LIDOCAINE HYDROCHLORIDE 50 MG: 20 INJECTION, SOLUTION INFILTRATION; PERINEURAL at 09:56

## 2023-11-22 RX ADMIN — SODIUM CHLORIDE, POTASSIUM CHLORIDE, SODIUM LACTATE AND CALCIUM CHLORIDE 30 ML/HR: 600; 310; 30; 20 INJECTION, SOLUTION INTRAVENOUS at 09:34

## 2023-11-22 RX ADMIN — PROPOFOL 160 MCG/KG/MIN: 10 INJECTION, EMULSION INTRAVENOUS at 09:57

## 2023-11-22 NOTE — ANESTHESIA POSTPROCEDURE EVALUATION
Patient: Seth Montgomery    Procedure Summary       Date: 11/22/23 Room / Location: [facility] ENDOSCOPY 5 /  [facility]    Anesthesia Start: 0942 Anesthesia Stop: 1024    Procedure: COLONOSCOPY INTO CECUM AND TI Diagnosis:       History of adenomatous polyp of colon      Diverticulosis      Internal hemorrhoids      (History of adenomatous polyp of colon [Z86.010])    Surgeons: Fredi Mirza MD Provider: Michael Meier MD    Anesthesia Type: MAC ASA Status: 2            Anesthesia Type: MAC    Vitals  Vitals Value Taken Time   BP 90/69 11/22/23 1044   Temp     Pulse 61 11/22/23 1047   Resp 15 11/22/23 1044   SpO2 98 % 11/22/23 1045   Vitals shown include unfiled device data.        Post Anesthesia Care and Evaluation    Patient location during evaluation: PACU  Patient participation: complete - patient participated  Level of consciousness: awake  Pain score: 1  Pain management: adequate    Airway patency: patent  Anesthetic complications: No anesthetic complications  PONV Status: none  Cardiovascular status: acceptable  Respiratory status: acceptable  Hydration status: acceptable

## 2023-11-22 NOTE — H&P
[facility]  Pre Procedure History & Physical    Chief Complaint:   History colon polyps     Subjective     HPI:   Patient 68-year-old male with history of hyperlipidemia, diverticulosis and depression presenting for history colon polyps.  Patient with occasional hemorrhoidal bleeding with no complaints here for surveillance colonoscopy    Past Medical History:   Past Medical History:   Diagnosis Date    Anxiety     Depression     Diverticula of colon     Diverticulitis 2005    Fasciculation     H/O esophageal reflux     Hyperlipidemia     Leukocytopenia 06/23/2017       Past Surgical History:  Past Surgical History:   Procedure Laterality Date    COLONOSCOPY N/A 10/22/2018    Procedure: COLONOSCOPY to cecum and TI:  cold polypectomies;  Surgeon: Fredi Mirza MD;  Location: [facility] ENDOSCOPY;  Service: Gastroenterology    COLONOSCOPY W/ POLYPECTOMY N/A 01/31/2014    One 6 mm polyp in the transverse colon (path: hyperplastic), one 2 mm polyp in the sigmoid colon (path: hyperplastic), diverticulosis in the sigmoid colon, non-bleeding internal hemorrhoids, normal ileum-Dr. Fredi Mirza    COLONOSCOPY W/ POLYPECTOMY N/A 01/04/2007    Normal terminal ileum, two 18 mm polyps in the mid ascending colon (path: hyperplastic)-Dr. Fredi Mirza    HERNIA REPAIR      UPPER GASTROINTESTINAL ENDOSCOPY N/A 08/06/2010    LA Grade A reflux esophagitis, z-line irregular at the GE junction, hiatus hernia, gastric mucosal abnormality characterized by erythema, normal duodenum-Dr. Fredi Mirza       Family History:  Family History   Problem Relation Age of Onset    Transient ischemic attack Mother     Breast cancer Mother 75    Von Willebrand disease Mother     Hypertension Father     Heart disease Father     Von Willebrand disease Sister     Coronary artery disease Brother     Aortic aneurysm Brother         descending    Ovarian cancer Maternal Grandmother 62    Malig Hyperthermia Neg Hx   "      Social History:   reports that he quit smoking about 5 years ago. His smoking use included cigarettes. He started smoking about 49 years ago. He has a 2.75 pack-year smoking history. He has never been exposed to tobacco smoke. He has never used smokeless tobacco. He reports that he does not currently use alcohol. He reports current drug use. Drug: Marijuana.    Medications:   Medications Prior to Admission   Medication Sig Dispense Refill Last Dose    hyoscyamine (LEVSIN) 0.125 MG SL tablet Place 1 tablet under the tongue Every 4 (Four) Hours As Needed for Cramping or Diarrhea.   Past Month    Ibuprofen (ADVIL PO) Take 1 tablet by mouth As Needed.   Past Week    Nicotine Polacrilex (NICORETTE MT) Apply 1 tablet to the mouth or throat As Needed.   11/22/2023    nicotine polacrilex (NICORETTE) 4 MG gum Chew 1 each As Needed for Smoking Cessation.   11/21/2023    polyethylene glycol (MIRALAX) 17 GM/SCOOP powder Take 17 g by mouth Daily. 578 g 11 11/21/2023    sildenafil (REVATIO) 20 MG tablet Take 1 tablet by mouth See Admin Instructions. 90 tablet 3 Past Month    simvastatin (ZOCOR) 40 MG tablet Take 1 tablet by mouth Every Night. 90 tablet 2 Past Week       Allergies:  Patient has no known allergies.    ROS:    Pertinent items are noted in HPI     Objective     Blood pressure 106/70, pulse 60, resp. rate 15, height 172.7 cm (68\"), weight 67.6 kg (149 lb), SpO2 97%.    Physical Exam   Constitutional: Pt is oriented to person, place, and time and well-developed, well-nourished, and in no distress.   Mouth/Throat: Oropharynx is clear and moist.   Neck: Normal range of motion.   Cardiovascular: Normal rate, regular rhythm and normal heart sounds.    Pulmonary/Chest: Effort normal and breath sounds normal.   Abdominal: Soft. Nontender  Skin: Skin is warm and dry.   Psychiatric: Mood, memory, affect and judgment normal.     Assessment & Plan     Diagnosis:  History colon polyps    Anticipated Surgical " Procedure:  Colonoscopy    The risks, benefits, and alternatives of this procedure have been discussed with the patient or the responsible party- the patient understands and agrees to proceed.

## 2023-11-22 NOTE — DISCHARGE INSTRUCTIONS
For the next 24 hours patient needs to be with a responsible adult.    For 24 hours DO NOT drive, operate machinery, appliances, drink alcohol, make important decisions or sign legal documents.    Start with a light or bland diet if you are feeling sick to your stomach otherwise advance to regular diet as tolerated.    Follow recommendations on procedure report if provided by your doctor.    Call Dr Mirza for problems [phone]    Problems may include but not limited to: large amounts of bleeding, trouble breathing, repeated vomiting, severe unrelieved pain, fever or chills.

## 2023-11-22 NOTE — ANESTHESIA PREPROCEDURE EVALUATION
Anesthesia Evaluation     Patient summary reviewed     NPO Liquid Status: > 8 hours           Airway   Mallampati: II  No difficulty expected  Dental      Pulmonary    Cardiovascular     Rhythm: regular    (+) hyperlipidemia      Neuro/Psych  GI/Hepatic/Renal/Endo      Musculoskeletal     Abdominal    Substance History      OB/GYN          Other                    Anesthesia Plan    ASA 2     MAC       Anesthetic plan, risks, benefits, and alternatives have been provided, discussed and informed consent has been obtained with: patient.    CODE STATUS:

## 2023-11-22 NOTE — BRIEF OP NOTE
COLONOSCOPY  Progress Note    Seth Montgomery  11/22/2023    Pre-op Diagnosis:   History of adenomatous polyp of colon [Z86.010]       Post-Op Diagnosis Codes:     * History of adenomatous polyp of colon [Z86.010]     * Diverticulosis [K57.90]     * Internal hemorrhoids [K64.8]    Procedure/CPT® Codes:        Procedure(s):  COLONOSCOPY INTO CECUM AND TI              Surgeon(s):  Fredi Mirza MD    Anesthesia: Monitored Anesthesia Care    Staff:   Endo Technician: Melonie hCo  Endo Nurse: Sherin Hernández RN         Estimated Blood Loss: none    Urine Voided: * No values recorded between 11/22/2023  9:42 AM and 11/22/2023 10:18 AM *    Specimens:                None          Drains: * No LDAs found *    Findings: Colonoscopy to the terminal ileum with normal mucosa sigmoid diverticulosis and internal hemorrhoids noted.  Marked tortuosity of the colon was identified without difficulty passing.          Complications: None          Fredi Mirza MD     Date: 11/22/2023  Time: 10:18 EST

## 2023-12-12 NOTE — PROGRESS NOTES
"Assessment and Plan     Assessment & Plan  Healthcare maintenance    Return in about 1 year (around 12/13/2024).          Seth is a 68 y.o. being seen today for  Annual Exam   HISTORY    HPI  Seth Montgomery 68 y.o. male who presents for yearly preventive exam.  He exercises not enough. Fitness on [place].   Last colonoscopy: colonoscopy 0 years ago without abnormalities.  Immunizations: up to date  He has no increased risk of prostate cancer  He does see his dentist regularly  His diet is in general, a \"healthy\" diet    He describes his alcohol intake as none  His cardiovascular risk is: LDL goal is under 130  This patient has ever been tested for HepC : yes   Social History  He  reports that he quit smoking about 5 years ago. His smoking use included cigarettes. He started smoking about 49 years ago. He has a 2.75 pack-year smoking history. He has never been exposed to tobacco smoke. He has never used smokeless tobacco. He reports that he does not currently use alcohol. He reports current drug use. Drug: Marijuana.  EXAM DATA    Vital Signs  BP Readings from Last 1 Encounters:   12/13/23 120/70     Wt Readings from Last 3 Encounters:   12/13/23 68.5 kg (151 lb)   11/22/23 67.6 kg (149 lb)   08/23/23 68.5 kg (151 lb)   Body mass index is 22.96 kg/m².   Physical Exam  Vitals reviewed.   Constitutional:       Appearance: Normal appearance.   Cardiovascular:      Rate and Rhythm: Normal rate and regular rhythm.      Heart sounds: No murmur heard.  Pulmonary:      Effort: Pulmonary effort is normal.      Breath sounds: Normal breath sounds. No wheezing.   Neurological:      Mental Status: He is alert and oriented to person, place, and time.   Psychiatric:         Mood and Affect: Mood normal.         Behavior: Behavior normal.         Thought Content: Thought content normal.         Judgment: Judgment normal.       BMI is within normal parameters. No other follow-up for BMI required.               Patient was given " instructions and counseling regarding his condition or for health maintenance advice. Please see specific information pulled into the AVS if appropriate.

## 2023-12-13 ENCOUNTER — OFFICE VISIT (OUTPATIENT)
Dept: FAMILY MEDICINE CLINIC | Facility: CLINIC | Age: 68
End: 2023-12-13
Payer: COMMERCIAL

## 2023-12-13 VITALS
BODY MASS INDEX: 22.88 KG/M2 | WEIGHT: 151 LBS | DIASTOLIC BLOOD PRESSURE: 70 MMHG | HEIGHT: 68 IN | OXYGEN SATURATION: 98 % | SYSTOLIC BLOOD PRESSURE: 120 MMHG | HEART RATE: 63 BPM | RESPIRATION RATE: 18 BRPM

## 2023-12-13 DIAGNOSIS — E78.2 MIXED HYPERLIPIDEMIA: ICD-10-CM

## 2023-12-13 DIAGNOSIS — Z00.00 HEALTHCARE MAINTENANCE: Primary | ICD-10-CM

## 2023-12-13 RX ORDER — SIMVASTATIN 40 MG
40 TABLET ORAL NIGHTLY
Qty: 90 TABLET | Refills: 3 | Status: SHIPPED | OUTPATIENT
Start: 2023-12-13

## 2023-12-20 ENCOUNTER — TELEPHONE (OUTPATIENT)
Dept: FAMILY MEDICINE CLINIC | Facility: CLINIC | Age: 68
End: 2023-12-20
Payer: COMMERCIAL

## 2023-12-20 NOTE — TELEPHONE ENCOUNTER
[place] and is requesting a prescription for Paxlovid to be sent so that he may take it while he is gone.

## 2024-01-31 ENCOUNTER — TELEPHONE (OUTPATIENT)
Dept: FAMILY MEDICINE CLINIC | Facility: CLINIC | Age: 69
End: 2024-01-31
Payer: COMMERCIAL

## 2024-01-31 DIAGNOSIS — U07.1 COVID-19 VIRUS INFECTION: Primary | ICD-10-CM

## 2024-01-31 NOTE — TELEPHONE ENCOUNTER
Pt called and just tested positive for covid. Wants Paxlovid. I told him I would have you call as there are some side effects and paxlovid is not given to everyone.  Phone [phone]

## 2024-02-11 RX ORDER — POLYETHYLENE GLYCOL 3350 17 G/17G
POWDER, FOR SOLUTION ORAL
Qty: 510 G | Refills: 11 | Status: SHIPPED | OUTPATIENT
Start: 2024-02-11

## 2024-09-27 DIAGNOSIS — Z87.891 PERSONAL HISTORY OF TOBACCO USE, PRESENTING HAZARDS TO HEALTH: Primary | ICD-10-CM

## 2024-10-23 ENCOUNTER — HOSPITAL ENCOUNTER (OUTPATIENT)
Facility: HOSPITAL | Age: 69
Discharge: HOME | End: 2024-10-23
Admitting: FAMILY MEDICINE
Payer: COMMERCIAL

## 2024-10-23 DIAGNOSIS — Z87.891 PERSONAL HISTORY OF TOBACCO USE, PRESENTING HAZARDS TO HEALTH: ICD-10-CM

## 2024-10-23 PROCEDURE — 71271 CT THORAX LUNG CANCER SCR C-: CPT

## 2024-10-27 ENCOUNTER — PATIENT MESSAGE (OUTPATIENT)
Dept: FAMILY MEDICINE CLINIC | Facility: CLINIC | Age: 69
End: 2024-10-27
Payer: COMMERCIAL

## 2024-10-27 DIAGNOSIS — I77.810 ASCENDING AORTA DILATION: Primary | ICD-10-CM

## 2024-12-10 DIAGNOSIS — Z13.1 DIABETES MELLITUS SCREENING: ICD-10-CM

## 2024-12-10 DIAGNOSIS — E78.2 MIXED HYPERLIPIDEMIA: ICD-10-CM

## 2024-12-10 DIAGNOSIS — Z12.5 PROSTATE CANCER SCREENING: ICD-10-CM

## 2024-12-10 DIAGNOSIS — Z79.899 HIGH RISK MEDICATION USE: ICD-10-CM

## 2024-12-10 LAB
ALBUMIN SERPL-MCNC: 4.3 G/DL (ref 3.5–5.2)
ALBUMIN/GLOB SERPL: 2.5 G/DL
ALP SERPL-CCNC: 38 U/L (ref 39–117)
ALT SERPL-CCNC: 19 U/L (ref 1–41)
AST SERPL-CCNC: 22 U/L (ref 1–40)
BASOPHILS # BLD AUTO: 0.03 10*3/MM3 (ref 0–0.2)
BASOPHILS NFR BLD AUTO: 1.1 % (ref 0–1.5)
BILIRUB SERPL-MCNC: 0.7 MG/DL (ref 0–1.2)
BUN SERPL-MCNC: 11 MG/DL (ref 8–23)
BUN/CREAT SERPL: 13.3 (ref 7–25)
CALCIUM SERPL-MCNC: 9.6 MG/DL (ref 8.6–10.5)
CHLORIDE SERPL-SCNC: 106 MMOL/L (ref 98–107)
CHOLEST SERPL-MCNC: 211 MG/DL (ref 0–200)
CHOLEST/HDLC SERPL: 2.85 {RATIO}
CO2 SERPL-SCNC: 28.4 MMOL/L (ref 22–29)
CREAT SERPL-MCNC: 0.83 MG/DL (ref 0.76–1.27)
EGFRCR SERPLBLD CKD-EPI 2021: 95.3 ML/MIN/1.73
EOSINOPHIL # BLD AUTO: 0.06 10*3/MM3 (ref 0–0.4)
EOSINOPHIL NFR BLD AUTO: 2.3 % (ref 0.3–6.2)
ERYTHROCYTE [DISTWIDTH] IN BLOOD BY AUTOMATED COUNT: 11.6 % (ref 12.3–15.4)
GLOBULIN SER CALC-MCNC: 1.7 GM/DL
GLUCOSE SERPL-MCNC: 91 MG/DL (ref 65–99)
HCT VFR BLD AUTO: 39.3 % (ref 37.5–51)
HDLC SERPL-MCNC: 74 MG/DL (ref 40–60)
HGB BLD-MCNC: 13.3 G/DL (ref 13–17.7)
IMM GRANULOCYTES # BLD AUTO: 0 10*3/MM3 (ref 0–0.05)
IMM GRANULOCYTES NFR BLD AUTO: 0 % (ref 0–0.5)
LDLC SERPL CALC-MCNC: 121 MG/DL (ref 0–100)
LYMPHOCYTES # BLD AUTO: 1.19 10*3/MM3 (ref 0.7–3.1)
LYMPHOCYTES NFR BLD AUTO: 45.4 % (ref 19.6–45.3)
MCH RBC QN AUTO: 32.8 PG (ref 26.6–33)
MCHC RBC AUTO-ENTMCNC: 33.8 G/DL (ref 31.5–35.7)
MCV RBC AUTO: 96.8 FL (ref 79–97)
MONOCYTES # BLD AUTO: 0.21 10*3/MM3 (ref 0.1–0.9)
MONOCYTES NFR BLD AUTO: 8 % (ref 5–12)
NEUTROPHILS # BLD AUTO: 1.13 10*3/MM3 (ref 1.7–7)
NEUTROPHILS NFR BLD AUTO: 43.2 % (ref 42.7–76)
NRBC BLD AUTO-RTO: 0 /100 WBC (ref 0–0.2)
PLATELET # BLD AUTO: 189 10*3/MM3 (ref 140–450)
POTASSIUM SERPL-SCNC: 4.2 MMOL/L (ref 3.5–5.2)
PROT SERPL-MCNC: 6 G/DL (ref 6–8.5)
PSA SERPL-MCNC: 1.52 NG/ML (ref 0–4)
RBC # BLD AUTO: 4.06 10*6/MM3 (ref 4.14–5.8)
SODIUM SERPL-SCNC: 142 MMOL/L (ref 136–145)
TRIGL SERPL-MCNC: 89 MG/DL (ref 0–150)
VLDLC SERPL CALC-MCNC: 16 MG/DL (ref 5–40)
WBC # BLD AUTO: 2.62 10*3/MM3 (ref 3.4–10.8)

## 2024-12-16 ENCOUNTER — OFFICE VISIT (OUTPATIENT)
Dept: FAMILY MEDICINE CLINIC | Facility: CLINIC | Age: 69
End: 2024-12-16
Payer: COMMERCIAL

## 2024-12-16 VITALS
RESPIRATION RATE: 18 BRPM | SYSTOLIC BLOOD PRESSURE: 120 MMHG | DIASTOLIC BLOOD PRESSURE: 80 MMHG | HEIGHT: 68 IN | WEIGHT: 150 LBS | BODY MASS INDEX: 22.73 KG/M2 | HEART RATE: 71 BPM | OXYGEN SATURATION: 97 %

## 2024-12-16 DIAGNOSIS — F52.21 ED (ERECTILE DYSFUNCTION) OF NON-ORGANIC ORIGIN: ICD-10-CM

## 2024-12-16 DIAGNOSIS — Z00.00 HEALTHCARE MAINTENANCE: Primary | ICD-10-CM

## 2024-12-16 DIAGNOSIS — G57.12 MERALGIA PARESTHETICA OF LEFT SIDE: ICD-10-CM

## 2024-12-16 DIAGNOSIS — Z83.2 FAMILY HISTORY OF VON WILLEBRAND DISEASE: ICD-10-CM

## 2024-12-16 DIAGNOSIS — D70.8 OTHER NEUTROPENIA: ICD-10-CM

## 2024-12-16 DIAGNOSIS — E78.2 MIXED HYPERLIPIDEMIA: ICD-10-CM

## 2024-12-16 PROCEDURE — 99397 PER PM REEVAL EST PAT 65+ YR: CPT | Performed by: FAMILY MEDICINE

## 2024-12-16 RX ORDER — SILDENAFIL CITRATE 20 MG/1
20 TABLET ORAL SEE ADMIN INSTRUCTIONS
Qty: 90 TABLET | Refills: 3 | Status: SHIPPED | OUTPATIENT
Start: 2024-12-16

## 2024-12-16 RX ORDER — ROSUVASTATIN CALCIUM 10 MG/1
10 TABLET, COATED ORAL DAILY
Qty: 90 TABLET | Refills: 3 | Status: SHIPPED | OUTPATIENT
Start: 2024-12-16

## 2024-12-16 NOTE — PROGRESS NOTES
Assessment & Plan  Healthcare maintenance         ED (erectile dysfunction) of non-organic origin    Orders:    sildenafil (REVATIO) 20 MG tablet; Take 1 tablet by mouth See Admin Instructions.    Family history of von Willebrand disease    Orders:    Von Willebrand Panel    Other neutropenia               Hyperlipidemia  His cholesterol levels remain elevated despite adherence to simvastatin 40 mg. A transition to Crestor 10 mg is recommended, as it may provide a more potent effect without additional side effects. Potential side effects, including muscle aches and cramping, were discussed. He is advised to stay hydrated to help mitigate these side effects.    Nicotine dependence.  He continues to use Nicorette and expresses a desire to quit due to cost and health concerns. He was counseled on the increased risk of heart attack and stroke associated with nicotine use. He was encouraged to quit using Nicorette and was reassured that potential weight gain from quitting would not outweigh the health benefits.    Aortic aneurysm.  He has a known aortic aneurysm, and an echocardiogram is scheduled for January 2025. Depending on the results, a referral to cardiology may be necessary. He was advised to continue his current exercise regimen, including indoor cycling and weightlifting, as it does not pose a risk given the current size of the aneurysm.    Folliculitis.  He presents with a painful, red lesion on his face, likely folliculitis. He was advised to monitor the lesion for 2 to 3 weeks. If it does not improve or worsens, further evaluation may be necessary.    Prostatic symptoms.  He reports new symptoms of dribbling and possible incomplete bladder emptying. His PSA levels are within normal limits. If symptoms become bothersome, a referral to a urologist will be considered.    Meralgia paresthetica.  He reports occasional tingling and itching in his left thigh, likely due to irritation of the lateral cutaneous  nerve. This can be exacerbated by weightlifting, tight clothing, or certain sleeping positions. He was advised to continue monitoring and make adjustments as needed.    Cognitive decline.  He reports worsening cognitive symptoms, including forgetting names. He was reassured that his symptoms do not align with early dementia or Alzheimer's, as he retains memory of older information. He was encouraged to quit nicotine and continue exercising, which can help with cognitive function.    Von Willebrand disease.  He has a family history of Von Willebrand disease and expressed interest in being tested. A test for factor VIII and other specific factors related to Von Willebrand disease will be conducted today.    Leukopenia.  His white blood cell count is slightly lower than previous levels.     Patient was given instructions and counseling regarding his condition or for health maintenance advice. Please see specific information pulled into the AVS if appropriate.          Seth is a 69 y.o. being seen today for  Annual Exam   HISTORY    HPI      The patient presents for evaluation of hypercholesterolemia, aortic aneurysm, folliculitis, prostate issues, cognitive decline, and Von Willebrand disease.    He has been adhering to his simvastatin regimen, although not consistently in the past. He reports experiencing muscle aches and cramping, which he attributes to his age. He maintains adequate hydration. Simvastatin 40 mg    He has been using Nicorette for approximately 5 to 6 years and expresses a desire to discontinue its use due to financial constraints. He is concerned about potential weight gain associated with cessation of nicotine use, as he has previously lost weight and wishes to maintain his current weight.     He has been diagnosed with an aortic aneurysm, as revealed by a low-dose CT scan. He has a family history of aortic aneurysms, with 3 out of 4 siblings affected. He is scheduled for an echocardiogram in  2025. He has transitioned from outdoor cycling to indoor spinning, which he finds more intense but manageable. He does not experience any adverse effects post-exercise. His exercise routine includes 45 to 60 minutes of cycling on hilly terrain, covering a distance of 11 miles, and a similar duration on the spin bike. He also engages in weightlifting exercises but very light weights.    He reports a sudden eruption of a painful lesion on his face last Friday, which he initially mistook for a pimple. Despite attempts to drain it, the lesion remains unresponsive. He notes a slight improvement in the condition today.    He has been experiencing prostate-related symptoms, including dribbling and incomplete bladder emptying. He is uncertain about the latter symptom due to his high water intake. He consulted a urologist approximately 1.5 years ago, but no significant findings were reported.    He has been experiencing occasional tingling and itching in his left thigh. He suspects that his sleeping position may be contributing to these symptoms. He has recently changed his pillows and has not noticed these symptoms for several weeks.    He reports a decline in his cognitive function, characterized by forgetfulness, particularly names. He does not experience disorientation while driving. He is uncertain if his friends have observed similar changes. He expresses concern about potential ADD. He has a family history of dementia, with his mother having exhibited signs in her 40s and 50s, and eventually succumbing to the disease at the age of 90. His younger siblings do not report any cognitive issues.    He has a sister with Von Willebrand disease and is interested in getting tested for it.    SOCIAL HISTORY  The patient uses Nicorette and has been using it for five or six years.    FAMILY HISTORY  Three of the patient's four siblings have aortic aneurysms. The patient's mother  of dementia at age 90, showing signs in her  40s and 50s. The patient's father was a lifelong smoker and drinker who  of something related to cardiovascular issues. The patient's sister has Von Willebrand disease.       Social History  He  reports that he quit smoking about 6 years ago. His smoking use included cigarettes. He started smoking about 50 years ago. He has a 22 pack-year smoking history. He has never been exposed to tobacco smoke. He has never used smokeless tobacco. He reports that he does not currently use alcohol. He reports current drug use. Drug: Marijuana.    Review of Systems   Genitourinary:  Positive for decreased urine volume and frequency.   Skin:  Positive for wound.   All other systems reviewed and are negative.      EXAM DATA    Vital Signs        BP Readings from Last 1 Encounters:   24 120/80     Wt Readings from Last 3 Encounters:   24 68 kg (150 lb)   23 68.5 kg (151 lb)   23 67.6 kg (149 lb)   Body mass index is 22.81 kg/m².  Physical Exam  Vitals reviewed.   Constitutional:       General: He is not in acute distress.     Appearance: He is well-developed.   HENT:      Head: Normocephalic and atraumatic.      Right Ear: Tympanic membrane, ear canal and external ear normal.      Left Ear: Tympanic membrane, ear canal and external ear normal.   Eyes:      Conjunctiva/sclera: Conjunctivae normal.   Neck:      Thyroid: No thyromegaly.   Cardiovascular:      Rate and Rhythm: Normal rate and regular rhythm.      Pulses:           Dorsalis pedis pulses are 1+ on the right side and 1+ on the left side.        Posterior tibial pulses are 1+ on the right side and 1+ on the left side.      Heart sounds: Normal heart sounds. No murmur heard.  Pulmonary:      Effort: Pulmonary effort is normal. No respiratory distress.      Breath sounds: Normal breath sounds. No wheezing or rales.   Abdominal:      General: There is no distension.      Palpations: Abdomen is soft. There is no mass.      Tenderness: There is no  abdominal tenderness.   Musculoskeletal:         General: No deformity. Normal range of motion.      Cervical back: Neck supple.   Lymphadenopathy:      Cervical: No cervical adenopathy.   Skin:     General: Skin is warm and dry.   Neurological:      Mental Status: He is alert and oriented to person, place, and time.   Psychiatric:         Behavior: Behavior normal.         Thought Content: Thought content normal.         Judgment: Judgment normal.                Patient or patient representative verbalized consent for the use of Ambient Listening during the visit with  Yenny Mendoza MD for chart documentation. 12/16/2024  11:23 EST

## 2024-12-17 LAB
FACT VIII ACT/NOR PPP: 112 % (ref 56–140)
PATH INTERP BLD-IMP: NORMAL
VWF AG ACT/NOR PPP IA: 127 % (ref 50–200)
VWF:RCO ACT/NOR PPP PL AGG: 104 % (ref 50–200)

## 2025-01-14 ENCOUNTER — HOSPITAL ENCOUNTER (OUTPATIENT)
Dept: CARDIOLOGY | Facility: HOSPITAL | Age: 70
Discharge: HOME | End: 2025-01-14
Admitting: FAMILY MEDICINE
Payer: MEDICARE

## 2025-01-14 VITALS
HEIGHT: 68 IN | DIASTOLIC BLOOD PRESSURE: 77 MMHG | BODY MASS INDEX: 22.73 KG/M2 | WEIGHT: 150 LBS | HEART RATE: 54 BPM | SYSTOLIC BLOOD PRESSURE: 126 MMHG

## 2025-01-14 DIAGNOSIS — R94.39 ABNORMAL STRESS ECHO: Primary | ICD-10-CM

## 2025-01-14 DIAGNOSIS — I77.810 ASCENDING AORTA DILATION: ICD-10-CM

## 2025-01-14 LAB
AORTIC DIMENSIONLESS INDEX: 0.7 (DI)
ASCENDING AORTA: 3.9 CM
AV MEAN PRESS GRAD SYS DOP V1V2: 2.6 MMHG
AV VMAX SYS DOP: 110.9 CM/SEC
BH CV ECHO LEFT VENTRICLE GLOBAL LONGITUDINAL STRAIN: -19 %
BH CV ECHO MEAS - AO MAX PG: 4.9 MMHG
BH CV ECHO MEAS - AO ROOT DIAM: 3.8 CM
BH CV ECHO MEAS - AO V2 VTI: 23.8 CM
BH CV ECHO MEAS - AVA(I,D): 2.43 CM2
BH CV ECHO MEAS - EDV(CUBED): 99.2 ML
BH CV ECHO MEAS - EDV(MOD-SP2): 91 ML
BH CV ECHO MEAS - EDV(MOD-SP4): 101 ML
BH CV ECHO MEAS - EF(MOD-SP2): 57.1 %
BH CV ECHO MEAS - EF(MOD-SP4): 55.4 %
BH CV ECHO MEAS - ESV(CUBED): 24.7 ML
BH CV ECHO MEAS - ESV(MOD-SP2): 39 ML
BH CV ECHO MEAS - ESV(MOD-SP4): 45 ML
BH CV ECHO MEAS - FS: 37.1 %
BH CV ECHO MEAS - IVS/LVPW: 1.08 CM
BH CV ECHO MEAS - IVSD: 0.83 CM
BH CV ECHO MEAS - LAT PEAK E' VEL: 10.7 CM/SEC
BH CV ECHO MEAS - LV DIASTOLIC VOL/BSA (35-75): 55.8 CM2
BH CV ECHO MEAS - LV MASS(C)D: 119.1 GRAMS
BH CV ECHO MEAS - LV MAX PG: 2.6 MMHG
BH CV ECHO MEAS - LV MEAN PG: 1.34 MMHG
BH CV ECHO MEAS - LV SYSTOLIC VOL/BSA (12-30): 24.9 CM2
BH CV ECHO MEAS - LV V1 MAX: 81.2 CM/SEC
BH CV ECHO MEAS - LV V1 VTI: 17.5 CM
BH CV ECHO MEAS - LVIDD: 4.6 CM
BH CV ECHO MEAS - LVIDS: 2.9 CM
BH CV ECHO MEAS - LVOT AREA: 3.3 CM2
BH CV ECHO MEAS - LVOT DIAM: 2.05 CM
BH CV ECHO MEAS - LVPWD: 0.77 CM
BH CV ECHO MEAS - MED PEAK E' VEL: 8.7 CM/SEC
BH CV ECHO MEAS - MR MAX PG: 100.8 MMHG
BH CV ECHO MEAS - MR MAX VEL: 502.1 CM/SEC
BH CV ECHO MEAS - MV A DUR: 0.1 SEC
BH CV ECHO MEAS - MV A MAX VEL: 65.5 CM/SEC
BH CV ECHO MEAS - MV DEC SLOPE: 453.3 CM/SEC2
BH CV ECHO MEAS - MV DEC TIME: 0.2 SEC
BH CV ECHO MEAS - MV E MAX VEL: 56.6 CM/SEC
BH CV ECHO MEAS - MV E/A: 0.86
BH CV ECHO MEAS - MV MAX PG: 2.32 MMHG
BH CV ECHO MEAS - MV MEAN PG: 0.81 MMHG
BH CV ECHO MEAS - MV P1/2T: 51.8 MSEC
BH CV ECHO MEAS - MV V2 VTI: 23.7 CM
BH CV ECHO MEAS - MVA(P1/2T): 4.2 CM2
BH CV ECHO MEAS - MVA(VTI): 2.44 CM2
BH CV ECHO MEAS - PA ACC TIME: 0.12 SEC
BH CV ECHO MEAS - PA V2 MAX: 80.2 CM/SEC
BH CV ECHO MEAS - PI END-D VEL: 64.2 CM/SEC
BH CV ECHO MEAS - PULM A REVS DUR: 0.08 SEC
BH CV ECHO MEAS - PULM A REVS VEL: 30.9 CM/SEC
BH CV ECHO MEAS - PULM DIAS VEL: 34.1 CM/SEC
BH CV ECHO MEAS - PULM S/D: 1.65
BH CV ECHO MEAS - PULM SYS VEL: 56.1 CM/SEC
BH CV ECHO MEAS - RAP SYSTOLE: 3 MMHG
BH CV ECHO MEAS - RV MAX PG: 1.5 MMHG
BH CV ECHO MEAS - RV V1 MAX: 61.3 CM/SEC
BH CV ECHO MEAS - RV V1 VTI: 14 CM
BH CV ECHO MEAS - RVSP: 30.3 MMHG
BH CV ECHO MEAS - SV(LVOT): 57.9 ML
BH CV ECHO MEAS - SV(MOD-SP2): 52 ML
BH CV ECHO MEAS - SV(MOD-SP4): 56 ML
BH CV ECHO MEAS - SVI(LVOT): 32 ML/M2
BH CV ECHO MEAS - SVI(MOD-SP2): 28.8 ML/M2
BH CV ECHO MEAS - SVI(MOD-SP4): 31 ML/M2
BH CV ECHO MEAS - TAPSE (>1.6): 3.1 CM
BH CV ECHO MEAS - TR MAX PG: 27.3 MMHG
BH CV ECHO MEAS - TR MAX VEL: 261.2 CM/SEC
BH CV ECHO MEASUREMENTS AVERAGE E/E' RATIO: 5.84
BH CV XLRA - RV BASE: 3.4 CM
BH CV XLRA - RV LENGTH: 7.2 CM
BH CV XLRA - RV MID: 2.5 CM
BH CV XLRA - TDI S': 12.6 CM/SEC
LEFT ATRIUM VOLUME INDEX: 25.7 ML/M2
LV EF BIPLANE MOD: 55.3 %

## 2025-01-14 PROCEDURE — 93306 TTE W/DOPPLER COMPLETE: CPT

## 2025-01-14 PROCEDURE — 93356 MYOCRD STRAIN IMG SPCKL TRCK: CPT

## 2025-01-31 ENCOUNTER — PATIENT ROUNDING (BHMG ONLY) (OUTPATIENT)
Dept: CARDIOLOGY | Facility: CLINIC | Age: 70
End: 2025-01-31
Payer: MEDICARE

## 2025-01-31 ENCOUNTER — OFFICE VISIT (OUTPATIENT)
Dept: CARDIOLOGY | Facility: CLINIC | Age: 70
End: 2025-01-31
Payer: MEDICARE

## 2025-01-31 VITALS
DIASTOLIC BLOOD PRESSURE: 68 MMHG | OXYGEN SATURATION: 97 % | SYSTOLIC BLOOD PRESSURE: 110 MMHG | BODY MASS INDEX: 23.37 KG/M2 | HEART RATE: 51 BPM | HEIGHT: 68 IN | WEIGHT: 154.2 LBS

## 2025-01-31 DIAGNOSIS — I36.1 NONRHEUMATIC TRICUSPID VALVE REGURGITATION: ICD-10-CM

## 2025-01-31 DIAGNOSIS — I25.10 CORONARY ARTERY CALCIFICATION SEEN ON CT SCAN: ICD-10-CM

## 2025-01-31 DIAGNOSIS — I34.0 NONRHEUMATIC MITRAL VALVE REGURGITATION: Primary | ICD-10-CM

## 2025-01-31 PROCEDURE — 93000 ELECTROCARDIOGRAM COMPLETE: CPT | Performed by: INTERNAL MEDICINE

## 2025-01-31 PROCEDURE — 99204 OFFICE O/P NEW MOD 45 MIN: CPT | Performed by: INTERNAL MEDICINE

## 2025-01-31 NOTE — PROGRESS NOTES
Subjective:     Encounter Date:01/31/25      Patient ID: Seth Montgomery is a 69 y.o. male.    Chief Complaint:  History of Present Illness    Dear Dr. Mendoza,    I had the pleasure of seeing this patient in the office today for initial evaluation and consultation.  I appreciate that you sent him in to see us.  They come in today to be seen for after recent echocardiogram and chest CT.    Echocardiogram demonstrated some atrial and tricuspid regurgitation:  Results for orders placed during the hospital encounter of 01/14/25    Adult Transthoracic Echo Complete W/ Cont if Necessary Per Protocol    Interpretation Summary    Left ventricular systolic function is normal. Calculated left ventricular EF = 55.3%    Left ventricular diastolic function was normal.    Moderate tricuspid regurgitation.  Estimated right ventricular systolic pressure from tricuspid regurgitation is normal (<35 mmHg).    Mild dilation of the ascending aorta is present measuring 3.9cm    Mild mitral regurgitation      Patient states that he is here today both to follow-up on the echocardiogram as well as his recent CT scan.  He says he is reviewed these in epic but otherwise has not really had any discussion yet.  He had a chest CT performed because of his history of tobacco abuse.  Coronary calcification was noted which is why he was referred here.  4.2 cm dilatation of the ascending thoracic aorta was also noted.    Patient also had an echocardiogram, reviewed above, and with the mitral and trace regurgitation he said he was also instructed follow-up with cardiology.    Patient is very physically active.  No symptoms or issues when he is active.  Rare lower precordial upper epigastric discomfort without radiation.  Not associated with activity or exercise.  No difficulty doing intense spinning classes.  Also does weights but not trying to build bulk.      The following portions of the patient's history were reviewed and updated as  "appropriate: allergies, current medications, past family history, past medical history, past social history, past surgical history and problem list.      ECG 12 Lead    Date/Time: 1/31/2025 9:34 AM  Performed by: Scottie Carreon III, MD    Authorized by: Scottie Carreon III, MD  Comparison: compared with previous ECG   Similar to previous ECG  Rhythm: sinus rhythm  Rate: normal  Conduction: conduction normal  ST Segments: ST segments normal  T Waves: T waves normal  QRS axis: normal  Other: no other findings    Clinical impression: normal ECG           Objective:     Vitals:    01/31/25 0833   BP: 110/68   BP Location: Right arm   Patient Position: Sitting   Cuff Size: Adult   Pulse: 51   SpO2: 97%   Weight: 69.9 kg (154 lb 3.2 oz)   Height: 172.7 cm (68\")     Body mass index is 23.45 kg/m².      Vitals reviewed.   Constitutional:       General: Not in acute distress.     Appearance: Well-developed. Not diaphoretic.   Eyes:      General:         Right eye: No discharge.         Left eye: No discharge.      Conjunctiva/sclera: Conjunctivae normal.   HENT:      Head: Normocephalic and atraumatic.      Nose: Nose normal.   Neck:      Thyroid: No thyromegaly.      Trachea: No tracheal deviation.   Pulmonary:      Effort: Pulmonary effort is normal. No respiratory distress.      Breath sounds: Normal breath sounds. No stridor.   Chest:      Chest wall: Not tender to palpatation.   Cardiovascular:      Normal rate. Regular rhythm.      Murmurs: There is no murmur.      . No S3 gallop. No click. No rub.   Pulses:     Intact distal pulses.   Edema:     Peripheral edema absent.   Abdominal:      General: Bowel sounds are normal. There is no distension.      Palpations: Abdomen is soft. There is no abdominal mass.   Musculoskeletal: Normal range of motion.         General: No tenderness or deformity.      Cervical back: Normal range of motion and neck supple. Skin:     General: Skin is warm and dry.      Findings: No erythema " "or rash.   Neurological:      Mental Status: Alert.   Psychiatric:         Attention and Perception: Attention normal.       Data and records reviewed:     Lab Results   Component Value Date    GLUCOSE 91 12/10/2024    BUN 11 12/10/2024    CREATININE 0.83 12/10/2024     12/10/2024    K 4.2 12/10/2024     12/10/2024    CALCIUM 9.6 12/10/2024    ALBUMIN 4.3 12/10/2024    ALT 19 12/10/2024    AST 22 12/10/2024    ALKPHOS 38 (L) 12/10/2024    BILITOT 0.7 12/10/2024    BCR 13.3 12/10/2024     No results found for: \"CHOL\"  Lab Results   Component Value Date    TRIG 89 12/10/2024    TRIG 74 12/11/2023    TRIG 148 11/10/2022     Lab Results   Component Value Date    HDL 74 (H) 12/10/2024    HDL 70 (H) 12/11/2023    HDL 75 (H) 11/10/2022     Lab Results   Component Value Date     (H) 12/10/2024     (H) 12/11/2023    LDL 89 11/10/2022     Lab Results   Component Value Date    VLDL 16 12/10/2024    VLDL 13 12/11/2023    VLDL 25 11/10/2022     Lab Results   Component Value Date    LDLHDL 1.7 11/04/2019    LDLHDL 1.67 06/25/2018    LDLHDL 1.87 04/27/2017     CBC          12/10/2024    08:30   CBC   WBC 2.62    RBC 4.06    Hemoglobin 13.3    Hematocrit 39.3    MCV 96.8    MCH 32.8    MCHC 33.8    RDW 11.6    Platelets 189      No radiology results for the last 90 days.  Results for orders placed during the hospital encounter of 01/14/25    Adult Transthoracic Echo Complete W/ Cont if Necessary Per Protocol    Interpretation Summary    Left ventricular systolic function is normal. Calculated left ventricular EF = 55.3%    Left ventricular diastolic function was normal.    Moderate tricuspid regurgitation.  Estimated right ventricular systolic pressure from tricuspid regurgitation is normal (<35 mmHg).    Mild dilation of the ascending aorta is present measuring 3.9cm    Mild mitral regurgitation          Assessment:          Diagnosis Plan   1. Nonrheumatic mitral valve regurgitation  Treadmill Stress " Test    Adult Transthoracic Echo Complete W/ Cont if Necessary Per Protocol    ECG 12 Lead      2. Nonrheumatic tricuspid valve regurgitation  Treadmill Stress Test    Adult Transthoracic Echo Complete W/ Cont if Necessary Per Protocol    ECG 12 Lead      3. Coronary artery calcification seen on CT scan  Treadmill Stress Test    Adult Transthoracic Echo Complete W/ Cont if Necessary Per Protocol    ECG 12 Lead             Plan:       1.  Coronary artery calcification seen on CT scan, we will arrange for an exercise stress test to evaluate for any inducible ischemia  2.  Mitral and trace regurgitation, mild to moderate on both, we will continue to follow, I will see back in a year and we will repeat an echocardiogram at that time.  3.  Mixed hyperlipidemia, now with LDL target of 70 given coronary arteriosclerosis, on statin therapy with follow-up labs for yourself.    Thank you very much for allowing us to participate in the care of this pleasant patient.  Please don't hesitate to call if I can be of assistance in any way.      Current Outpatient Medications:     Ibuprofen (ADVIL PO), Take 1 tablet by mouth As Needed., Disp: , Rfl:     nicotine polacrilex (NICORETTE) 4 MG gum, Chew 1 each As Needed for Smoking Cessation., Disp: , Rfl:     polyethylene glycol (MIRALAX) 17 GM/SCOOP powder, MAX 1 CAPFUL WITH FLUID OF CHOICE AND DRINK BY MOUTH DAILY, Disp: 510 g, Rfl: 11    rosuvastatin (Crestor) 10 MG tablet, Take 1 tablet by mouth Daily., Disp: 90 tablet, Rfl: 3    sildenafil (REVATIO) 20 MG tablet, Take 1 tablet by mouth See Admin Instructions., Disp: 90 tablet, Rfl: 3    hyoscyamine (LEVSIN) 0.125 MG SL tablet, Place 1 tablet under the tongue Every 4 (Four) Hours As Needed for Cramping or Diarrhea., Disp: 25 tablet, Rfl: 1         Return in about 1 year (around 1/31/2026).

## 2025-01-31 NOTE — PROGRESS NOTES
A My Chart message has been sent to the patient for PATIENT ROUNDING with [facility] – [place], [place]

## 2025-02-10 ENCOUNTER — TELEPHONE (OUTPATIENT)
Dept: CARDIOLOGY | Facility: CLINIC | Age: 70
End: 2025-02-10
Payer: MEDICARE

## 2025-02-11 ENCOUNTER — HOSPITAL ENCOUNTER (OUTPATIENT)
Dept: CARDIOLOGY | Facility: HOSPITAL | Age: 70
Discharge: HOME | End: 2025-02-11
Payer: MEDICARE

## 2025-02-11 DIAGNOSIS — I36.1 NONRHEUMATIC TRICUSPID VALVE REGURGITATION: ICD-10-CM

## 2025-02-11 DIAGNOSIS — I25.10 CORONARY ARTERY CALCIFICATION SEEN ON CT SCAN: ICD-10-CM

## 2025-02-11 DIAGNOSIS — I34.0 NONRHEUMATIC MITRAL VALVE REGURGITATION: ICD-10-CM

## 2025-02-11 LAB
BH CV STRESS BP STAGE 1: NORMAL
BH CV STRESS BP STAGE 2: NORMAL
BH CV STRESS BP STAGE 3: NORMAL
BH CV STRESS BP STAGE 4: NORMAL
BH CV STRESS DURATION MIN STAGE 1: 3
BH CV STRESS DURATION MIN STAGE 2: 3
BH CV STRESS DURATION MIN STAGE 3: 3
BH CV STRESS DURATION MIN STAGE 4: 2
BH CV STRESS DURATION SEC STAGE 1: 0
BH CV STRESS DURATION SEC STAGE 2: 0
BH CV STRESS DURATION SEC STAGE 3: 0
BH CV STRESS DURATION SEC STAGE 4: 0
BH CV STRESS GRADE STAGE 1: 10
BH CV STRESS GRADE STAGE 2: 12
BH CV STRESS GRADE STAGE 3: 14
BH CV STRESS GRADE STAGE 4: 16
BH CV STRESS HR STAGE 1: 90
BH CV STRESS HR STAGE 2: 115
BH CV STRESS HR STAGE 3: 135
BH CV STRESS HR STAGE 4: 152
BH CV STRESS METS STAGE 1: 5
BH CV STRESS METS STAGE 2: 7.5
BH CV STRESS METS STAGE 3: 10
BH CV STRESS METS STAGE 4: 12
BH CV STRESS PROTOCOL 1: NORMAL
BH CV STRESS RECOVERY BP: NORMAL MMHG
BH CV STRESS RECOVERY HR: 71 BPM
BH CV STRESS SPEED STAGE 1: 1.7
BH CV STRESS SPEED STAGE 2: 2.5
BH CV STRESS SPEED STAGE 3: 3.4
BH CV STRESS SPEED STAGE 4: 4.2
BH CV STRESS STAGE 1: 1
BH CV STRESS STAGE 2: 2
BH CV STRESS STAGE 3: 3
BH CV STRESS STAGE 4: 4
MAXIMAL PREDICTED HEART RATE: 152 BPM
PERCENT MAX PREDICTED HR: 100 %
STRESS BASELINE BP: NORMAL MMHG
STRESS BASELINE HR: 59 BPM
STRESS PERCENT HR: 118 %
STRESS POST ESTIMATED WORKLOAD: 12 METS
STRESS POST EXERCISE DUR MIN: 11 MIN
STRESS POST EXERCISE DUR SEC: 0 SEC
STRESS POST PEAK BP: NORMAL MMHG
STRESS POST PEAK HR: 152 BPM
STRESS TARGET HR: 129 BPM

## 2025-02-11 PROCEDURE — 93018 CV STRESS TEST I&R ONLY: CPT | Performed by: INTERNAL MEDICINE

## 2025-02-11 PROCEDURE — 93017 CV STRESS TEST TRACING ONLY: CPT

## 2025-02-11 PROCEDURE — 93016 CV STRESS TEST SUPVJ ONLY: CPT | Performed by: INTERNAL MEDICINE

## 2025-02-25 DIAGNOSIS — E78.2 MIXED HYPERLIPIDEMIA: ICD-10-CM

## 2025-02-25 RX ORDER — SIMVASTATIN 40 MG
40 TABLET ORAL NIGHTLY
Qty: 90 TABLET | Refills: 3 | OUTPATIENT
Start: 2025-02-25

## 2025-03-03 ENCOUNTER — TELEPHONE (OUTPATIENT)
Dept: FAMILY MEDICINE CLINIC | Facility: CLINIC | Age: 70
End: 2025-03-03

## 2025-03-03 NOTE — TELEPHONE ENCOUNTER
Caller: Seth Montgomery    Relationship: Self    Best call back number: [phone]     What medication are you requesting: WHATEVER YOU RECOMMEND    What are your current symptoms: RUNNING NOSE,SINUS PAIN, MILD HEADACHE    How long have you been experiencing symptoms: 3 WEEKS      If a prescription is needed, what is your preferred pharmacy and phone number:    [id number] [place], [place] - [address] AT [facility] [address] - [phone] [facility] [phone]  [phone]   Additional notes:    PATIENT IS GOING OUT OF THE COUN[place] ON THURSDAY 3.6.25. PATIENT HAS ALL THE SYMPTOMS LISTED ABOVE WHEN HE WAKES UP AND THEN THEY GO AWAY. PATIENT HASN'T TAKEN ANYTHING OVER THE COUNTER WANTED TO SEE IF YOU CAN RECOMMEND SOMETHING PLEASE?

## 2025-04-02 RX ORDER — POLYETHYLENE GLYCOL 3350 17 G/17G
17 POWDER, FOR SOLUTION ORAL DAILY
Qty: 510 G | Refills: 11 | Status: SHIPPED | OUTPATIENT
Start: 2025-04-02

## 2025-04-02 NOTE — TELEPHONE ENCOUNTER
Caller: Seth Montgomery    Relationship: Self    Best call back number: [phone]     Requested Prescriptions:   Requested Prescriptions     Pending Prescriptions Disp Refills    polyethylene glycol (MIRALAX) 17 GM/SCOOP powder 510 g 11        Pharmacy where request should be sent: [id number] [address] AT [facility] [address]  [place] - [phone] [facility] [phone] FX     Last office visit with prescribing clinician: 12/16/2024   Last telemedicine visit with prescribing clinician: Visit date not found   Next office visit with prescribing clinician: Visit date not found     Additional details provided by patient: PATIENT WAS GETTING FROM [facility] BUT THEY ARE RETIRED. HE IS OUT OF IT. REQUESTING (2) 510 GRAM BOTTLES.     Does the patient have less than a 3 day supply:  [x] Yes  [] No    Would you like a call back once the refill request has been completed: [] Yes [x] No    If the office needs to give you a call back, can they leave a voicemail: [] Yes [x] No    Amrit Diane Rep   04/02/25 09:58 EDT

## 2025-05-08 ENCOUNTER — OFFICE VISIT (OUTPATIENT)
Dept: ORTHOPEDIC SURGERY | Facility: CLINIC | Age: 70
End: 2025-05-08
Payer: MEDICARE

## 2025-05-08 VITALS — HEIGHT: 68 IN | WEIGHT: 154 LBS | BODY MASS INDEX: 23.34 KG/M2

## 2025-05-08 DIAGNOSIS — G89.29 CHRONIC PAIN OF RIGHT KNEE: Primary | ICD-10-CM

## 2025-05-08 DIAGNOSIS — M25.561 CHRONIC PAIN OF RIGHT KNEE: Primary | ICD-10-CM

## 2025-05-08 DIAGNOSIS — M22.41 CHONDROMALACIA OF PATELLOFEMORAL JOINT, RIGHT: ICD-10-CM

## 2025-05-08 RX ORDER — LIDOCAINE HYDROCHLORIDE 10 MG/ML
8 INJECTION, SOLUTION EPIDURAL; INFILTRATION; INTRACAUDAL; PERINEURAL
Status: COMPLETED | OUTPATIENT
Start: 2025-05-08 | End: 2025-05-08

## 2025-05-08 RX ORDER — TRIAMCINOLONE ACETONIDE 40 MG/ML
80 INJECTION, SUSPENSION INTRA-ARTICULAR; INTRAMUSCULAR
Status: COMPLETED | OUTPATIENT
Start: 2025-05-08 | End: 2025-05-08

## 2025-05-08 RX ADMIN — TRIAMCINOLONE ACETONIDE 80 MG: 40 INJECTION, SUSPENSION INTRA-ARTICULAR; INTRAMUSCULAR at 10:00

## 2025-05-08 RX ADMIN — LIDOCAINE HYDROCHLORIDE 8 ML: 10 INJECTION, SOLUTION EPIDURAL; INFILTRATION; INTRACAUDAL; PERINEURAL at 10:00

## 2025-05-08 NOTE — PROGRESS NOTES
Subjective:     Patient ID: Seth Montgomery is a 70 y.o. male.    Chief Complaint:  Chronic right knee pain, new exacerbation    History of Present Illness  History of Present Illness  Seth presents clinic today for evaluation of right knee pain localized primarily over the anterior and anterolateral aspect of his right knee, it got worse about 2 to 3 months ago particular with biking especially with resistance.  He states he got about 10 miles into a bike ride and had significant onset of pain that made him have to stop in the midportion of his ride.  Mild swelling does wax and wane.  Denies any arnav locking or catching.  Routine baseline activities with flat ground walking are not incredibly bothersome for him at this point in time.  His pain does wax and wane.  He has had some improvement in the last couple weeks but still rates pain as moderate and occasionally severe in intensity when it does occur with functional activities.  Limited improvement with bracing.  Denies hip or groin pain.  Denies radiation of pain.     Social History     Occupational History    Occupation:      Comment: [employer]   Tobacco Use    Smoking status: Former     Current packs/day: 0.00     Average packs/day: 0.5 packs/day for 44.0 years (22.0 ttl pk-yrs)     Types: Cigarettes     Start date:      Quit date: 2018     Years since quittin.3     Passive exposure: Past    Smokeless tobacco: Never    Tobacco comments:     intermittently smokes   Vaping Use    Vaping status: Never Used   Substance and Sexual Activity    Alcohol use: Yes     Comment: SOCIALLY    Drug use: Yes     Types: Marijuana    Sexual activity: Defer      Past Medical History:   Diagnosis Date    Anxiety     Depression     Diverticula of colon     Diverticulitis 2005    Fasciculation     H/O esophageal reflux     Hyperlipidemia     Leukocytopenia 2017     Past Surgical History:   Procedure Laterality Date    COLONOSCOPY N/A  "10/22/2018    Procedure: COLONOSCOPY to cecum and TI:  cold polypectomies;  Surgeon: Fredi Mirza MD;  Location:  [facility];  Service: Gastroenterology    COLONOSCOPY N/A 11/22/2023    Procedure: COLONOSCOPY INTO CECUM AND TI;  Surgeon: Fredi Mirza MD;  Location:  [facility];  Service: Gastroenterology;  Laterality: N/A;  PRE: HX OF POLYPS  POST: DIVERTICULOSIS, HEMORRHOIDS    COLONOSCOPY W/ POLYPECTOMY N/A 01/31/2014    One 6 mm polyp in the transverse colon (path: hyperplastic), one 2 mm polyp in the sigmoid colon (path: hyperplastic), diverticulosis in the sigmoid colon, non-bleeding internal hemorrhoids, normal ileum-Dr. Fredi Mirza    COLONOSCOPY W/ POLYPECTOMY N/A 01/04/2007    Normal terminal ileum, two 18 mm polyps in the mid ascending colon (path: hyperplastic)-Dr. Fredi Mirza    HERNIA REPAIR      UPPER GASTROINTESTINAL ENDOSCOPY N/A 08/06/2010    LA Grade A reflux esophagitis, z-line irregular at the GE junction, hiatus hernia, gastric mucosal abnormality characterized by erythema, normal duodenum-Dr. Fredi Mirza       Family History   Problem Relation Age of Onset    Transient ischemic attack Mother     Breast cancer Mother 75    Von Willebrand disease Mother     Hypertension Father     Heart disease Father     Von Willebrand disease Sister     Aortic aneurysm Sister     Coronary artery disease Brother     Aortic aneurysm Brother         descending    Ovarian cancer Maternal Grandmother 62    Malig Hyperthermia Neg Hx          Review of Systems        Objective:  Vitals:    05/08/25 0907   Weight: 69.9 kg (154 lb)   Height: 172.7 cm (68\")         05/08/25  0907   Weight: 69.9 kg (154 lb)     Body mass index is 23.42 kg/m².  Physical Exam    Vital signs reviewed.   General: No acute distress, alert and oriented  Eyes: conjunctiva clear; pupils equally round and reactive  ENT: external ears and nose atraumatic; oropharynx clear  CV: no peripheral edema  Resp: normal " respiratory effort  Skin: no rashes or wounds; normal turgor  Psych: mood and affect appropriate; recent and remote memory intact          Physical Exam         Right knee--range of motion 0 to 130 degrees, 4+ out of 5 strength in flexion and extension.  No joint line pain.  Maximal tenderness palpation lateral patella facet, positive Aktigel compression test, slight lateral patellar tracking, no inverted J sign, negative patella apprehension test.  Moderate effusion.  Stable to varus and valgus stress at 0 and 30 degrees.  Grade 1A Lachman, negative anterior posterior drawer.  No hip pain, on logroll or Stinchfield exam.  Imaging:  Right Knee X-Ray  Indication: Pain    AP, Lateral, and [place] views    Findings:  No fracture  No bony lesion  Normal soft tissues  Mild medial and patellofemoral compartment joint space narrowing, no significant deformity or osteophyte formation  Compared to prior office x-rays'  Assessment:        1. Chronic pain of right knee    2. Chondromalacia of patellofemoral joint, right           Plan:  Large Joint Arthrocentesis: R knee  Date/Time: 5/8/2025 10:00 AM  Consent given by: patient  Site marked: site marked  Timeout: Immediately prior to procedure a time out was called to verify the correct patient, procedure, equipment, support staff and site/side marked as required   Supporting Documentation  Indications: pain   Procedure Details  Location: knee - R knee  Needle size: 22 G  Approach: anterolateral  Medications administered: 80 mg triamcinolone acetonide 40 MG/ML; 8 mL lidocaine PF 1% 1 %  Patient tolerance: patient tolerated the procedure well with no immediate complications                Assessment & Plan  Reviewed treatment options at length with the patient in regards to his new exacerbation of chronic underlying right knee pain.  X-rays appear to be fairly stable at this point in time.  We did discuss option for advanced imaging but he declined that at this time.  In order  to try to help with his treatment we are going to try an intra-articular cortisone injection today as well as a Arturo pull lite brace which may be more functionally acceptable for him at this point.  I will follow-up with him as needed-if he still has issues and limitations may consider viscosupplement or advanced imaging    Patient would like to proceed with cortisone injection today to the right knee. Recommended limited use of affected extremity for the next 24 hours to only essential activites other than work on general active and passive motion. Recommended supplementing with ice and soft tissue massage. Discussed with patient that they should see results in 5-7 days, if no improvement in 5-6 weeks I have asked them to call the office to review other options. Patient should call office immediately if they notice redness, warmth, fevers, chills, or residual numbness or tingling for greater than 6 hours after injection.       Seth Valentina  was in agreement with plan and had all questions answered.     Orders:  Orders Placed This Encounter   Procedures    Large Joint Arthrocentesis: R knee    XR Knee 3+ View With [place] Right       Medications:  No orders of the defined types were placed in this encounter.      Followup:  No follow-ups on file.    Diagnoses and all orders for this visit:    1. Chronic pain of right knee (Primary)  -     XR Knee 3+ View With [place] Right  -     Large Joint Arthrocentesis: R knee    2. Chondromalacia of patellofemoral joint, right  -     Large Joint Arthrocentesis: R knee          BMI is within normal parameters. No other follow-up for BMI required.       Dictated utilizing Dragon dictation     Patient or patient representative verbalized consent for the use of Ambient Listening during the visit with  Jelani Yeager MD for chart documentation. 5/8/2025  09:43 EDT

## 2025-05-19 ENCOUNTER — TELEPHONE (OUTPATIENT)
Dept: ORTHOPEDIC SURGERY | Facility: CLINIC | Age: 70
End: 2025-05-19
Payer: MEDICARE

## 2025-05-19 NOTE — TELEPHONE ENCOUNTER
PATIENT CALLED THIS MORNING REQUESTING A BRACE FOR HIS LEFT KNEE.   I EXPLAINED TO THE PATIENT THAT THE LAST TIME HIS LEFT KNEE WAS DOCUMENTED ON WAS BACK IN 2023.     PATIENT STATES HE HAS A NEW MEDICARE INSURANCE SINCE THAT VISIT IN 2023.     I EXPLAINED THAT HIS INSURANCE MIGHT WANT HIM TO BE SEEN SPECIFICALLY FOR THE LEFT KNEE IN ORDER TO HAVE MEDICARE COVER THE COST OF THE BRACE.     PLEASE CALL PATIENT OR LET ME KNOW AND I CAN CALL PATIENT BACK.

## 2025-06-05 ENCOUNTER — OFFICE VISIT (OUTPATIENT)
Dept: ORTHOPEDIC SURGERY | Facility: CLINIC | Age: 70
End: 2025-06-05
Payer: MEDICARE

## 2025-06-05 VITALS — WEIGHT: 154 LBS | HEIGHT: 68 IN | BODY MASS INDEX: 23.34 KG/M2

## 2025-06-05 DIAGNOSIS — M22.2X2 PATELLOFEMORAL PAIN SYNDROME OF LEFT KNEE: ICD-10-CM

## 2025-06-05 DIAGNOSIS — M22.41 CHONDROMALACIA OF PATELLOFEMORAL JOINT, RIGHT: ICD-10-CM

## 2025-06-05 DIAGNOSIS — M25.562 LEFT KNEE PAIN, UNSPECIFIED CHRONICITY: Primary | ICD-10-CM

## 2025-06-05 PROCEDURE — 1159F MED LIST DOCD IN RCRD: CPT | Performed by: ORTHOPAEDIC SURGERY

## 2025-06-05 PROCEDURE — 1160F RVW MEDS BY RX/DR IN RCRD: CPT | Performed by: ORTHOPAEDIC SURGERY

## 2025-06-05 PROCEDURE — 99213 OFFICE O/P EST LOW 20 MIN: CPT | Performed by: ORTHOPAEDIC SURGERY

## 2025-06-05 NOTE — PROGRESS NOTES
Subjective:     Patient ID: Seth Montgomery is a 70 y.o. male.    Chief Complaint:  Left knee pain, new issue    History of Present Illness  History of Present Illness  Seth presents clinic today for evaluation of left anterior and anterior lateral knee pain has been present for the last several years on intermittent basis but particular worse over the last 3 to 4 months.  He has significant improvement after recent evaluation of his right knee with a Arturo pull lite brace.  Restaurant level pain is a 5-6 out of 10 primarily when he is doing deep flexion activities especially with resistance.  Denies radiation of pain, denies associate numbness or tingling, denies fever chills or sweats.     Social History     Occupational History    Occupation:      Comment: [employer]   Tobacco Use    Smoking status: Former     Current packs/day: 0.00     Average packs/day: 0.5 packs/day for 44.0 years (22.0 ttl pk-yrs)     Types: Cigarettes     Start date:      Quit date:      Years since quittin.4     Passive exposure: Past    Smokeless tobacco: Never    Tobacco comments:     intermittently smokes   Vaping Use    Vaping status: Never Used   Substance and Sexual Activity    Alcohol use: Yes     Comment: SOCIALLY    Drug use: Yes     Types: Marijuana    Sexual activity: Defer      Past Medical History:   Diagnosis Date    Anxiety     Depression     Diverticula of colon     Diverticulitis 2005    Fasciculation     H/O esophageal reflux     Hyperlipidemia     Leukocytopenia 2017     Past Surgical History:   Procedure Laterality Date    COLONOSCOPY N/A 10/22/2018    Procedure: COLONOSCOPY to cecum and TI:  cold polypectomies;  Surgeon: Fredi Mirza MD;  Location: [facility];  Service: Gastroenterology    COLONOSCOPY N/A 2023    Procedure: COLONOSCOPY INTO CECUM AND TI;  Surgeon: Fredi Mirza MD;  Location: [facility];  Service: Gastroenterology;  Laterality:  "N/A;  PRE: HX OF POLYPS  POST: DIVERTICULOSIS, HEMORRHOIDS    COLONOSCOPY W/ POLYPECTOMY N/A 01/31/2014    One 6 mm polyp in the transverse colon (path: hyperplastic), one 2 mm polyp in the sigmoid colon (path: hyperplastic), diverticulosis in the sigmoid colon, non-bleeding internal hemorrhoids, normal ileum-Dr. Fredi Mirza    COLONOSCOPY W/ POLYPECTOMY N/A 01/04/2007    Normal terminal ileum, two 18 mm polyps in the mid ascending colon (path: hyperplastic)-Dr. Fredi Mirza    HERNIA REPAIR      UPPER GASTROINTESTINAL ENDOSCOPY N/A 08/06/2010    LA Grade A reflux esophagitis, z-line irregular at the GE junction, hiatus hernia, gastric mucosal abnormality characterized by erythema, normal duodenum-Dr. Fredi Mirza       Family History   Problem Relation Age of Onset    Transient ischemic attack Mother     Breast cancer Mother 75    Von Willebrand disease Mother     Hypertension Father     Heart disease Father     Von Willebrand disease Sister     Aortic aneurysm Sister     Coronary artery disease Brother     Aortic aneurysm Brother         descending    Ovarian cancer Maternal Grandmother 62    Malig Hyperthermia Neg Hx          Review of Systems        Objective:  Vitals:    06/05/25 1023   Weight: 69.9 kg (154 lb)   Height: 172.7 cm (68\")         06/05/25  1023   Weight: 69.9 kg (154 lb)     Body mass index is 23.42 kg/m².  Physical Exam    Vital signs reviewed.   General: No acute distress, alert and oriented  Eyes: conjunctiva clear; pupils equally round and reactive  ENT: external ears and nose atraumatic; oropharynx clear  CV: no peripheral edema  Resp: normal respiratory effort  Skin: no rashes or wounds; normal turgor  Psych: mood and affect appropriate; recent and remote memory intact          Physical Exam  Left knee-active range of motion 0 to 130 degrees, 4+ out of 5 strength on flexion and extension, maximal tenderness palpation lateral patellar facet, mildly positive active patellar " compression test, slight increased lateral tilt and lateral patellofemoral tracking with no inverted J sign, negative patella apprehension test.  No medial or lateral joint line pain, no effusion.         Imaging:  Left Knee X-Ray  Indication: Pain    AP, Lateral, and [place] views    Findings:  No fracture  No bony lesion  Normal soft tissues  Mild patellofemoral chondral thinning and joint space narrowing noted, minimal evidence of reactive osteophyte formation, tibiofemoral joint spaces appear to be fairly well-maintained at this time with near anatomic alignment    No prior studies were available for comparison.      Assessment:        1. Left knee pain, unspecified chronicity    2. Patellofemoral pain syndrome of left knee    3. Chondromalacia of patellofemoral joint, right           Plan:          Assessment & Plan  Discussed treatment options at length with patient-his knee alignment and joint space looked pretty good at this point based on x-rays and clinical exam though he does have some narrowing of the patellofemoral joint and most symptomatic at this side especially with his higher intensity activities.  Given good improvement with his brace on the right, we will proceed with a Arturo pull lite brace on his left-sized and fitted for patient today.  Continue with home exercises for hip core and quad strengthening.  Follow-up as needed.    Seth Montgomery was in agreement with plan and had all questions answered.     Orders:  Orders Placed This Encounter   Procedures    XR Knee 3+ View With [place] Left       Medications:  No orders of the defined types were placed in this encounter.      Followup:  No follow-ups on file.    Diagnoses and all orders for this visit:    1. Left knee pain, unspecified chronicity (Primary)  -     XR Knee 3+ View With [place] Left    2. Patellofemoral pain syndrome of left knee    3. Chondromalacia of patellofemoral joint, right          BMI is within normal parameters. No other  follow-up for BMI required.       Dictated utilizing Dragon dictation     Patient or patient representative verbalized consent for the use of Ambient Listening during the visit with  Jelani Yeager MD for chart documentation. 6/5/2025  10:54 EDT

## 2025-08-14 ENCOUNTER — OFFICE VISIT (OUTPATIENT)
Dept: FAMILY MEDICINE CLINIC | Facility: CLINIC | Age: 70
End: 2025-08-14
Payer: MEDICARE

## 2025-08-14 VITALS
BODY MASS INDEX: 23.04 KG/M2 | WEIGHT: 152 LBS | DIASTOLIC BLOOD PRESSURE: 74 MMHG | SYSTOLIC BLOOD PRESSURE: 122 MMHG | HEART RATE: 65 BPM | RESPIRATION RATE: 18 BRPM | HEIGHT: 68 IN | OXYGEN SATURATION: 97 %

## 2025-08-14 DIAGNOSIS — J30.2 SEASONAL ALLERGIES: ICD-10-CM

## 2025-08-14 DIAGNOSIS — H93.13 TINNITUS OF BOTH EARS: ICD-10-CM

## 2025-08-14 DIAGNOSIS — H93.12: Primary | ICD-10-CM

## 2025-08-14 PROCEDURE — 1160F RVW MEDS BY RX/DR IN RCRD: CPT | Performed by: NURSE PRACTITIONER

## 2025-08-14 PROCEDURE — 99213 OFFICE O/P EST LOW 20 MIN: CPT | Performed by: NURSE PRACTITIONER

## 2025-08-14 PROCEDURE — 1159F MED LIST DOCD IN RCRD: CPT | Performed by: NURSE PRACTITIONER

## 2025-08-14 PROCEDURE — 1126F AMNT PAIN NOTED NONE PRSNT: CPT | Performed by: NURSE PRACTITIONER

## 2025-08-14 RX ORDER — FLUTICASONE PROPIONATE 50 MCG
2 SPRAY, SUSPENSION (ML) NASAL DAILY
Qty: 9.9 G | Refills: 2 | Status: SHIPPED | OUTPATIENT
Start: 2025-08-14

## 2025-08-20 ENCOUNTER — OFFICE VISIT (OUTPATIENT)
Dept: FAMILY MEDICINE CLINIC | Facility: CLINIC | Age: 70
End: 2025-08-20
Payer: MEDICARE

## 2025-08-20 VITALS
HEIGHT: 68 IN | BODY MASS INDEX: 24.1 KG/M2 | WEIGHT: 159 LBS | OXYGEN SATURATION: 97 % | HEART RATE: 70 BPM | DIASTOLIC BLOOD PRESSURE: 64 MMHG | SYSTOLIC BLOOD PRESSURE: 120 MMHG | RESPIRATION RATE: 18 BRPM

## 2025-08-20 DIAGNOSIS — R21 RASH OF BOTH FEET: ICD-10-CM

## 2025-08-20 DIAGNOSIS — S80.862A INSECT BITE (NONVENOMOUS), LEFT LOWER LEG, INITIAL ENCOUNTER (CODE): Primary | ICD-10-CM

## 2025-08-20 RX ORDER — METHYLPREDNISOLONE 4 MG/1
TABLET ORAL
Qty: 21 TABLET | Refills: 0 | Status: SHIPPED | OUTPATIENT
Start: 2025-08-20

## 2025-08-20 RX ORDER — BETAMETHASONE DIPROPIONATE 0.5 MG/G
1 CREAM TOPICAL 2 TIMES DAILY
Qty: 15 G | Refills: 1 | Status: SHIPPED | OUTPATIENT
Start: 2025-08-20

## (undated) DEVICE — KT ORCA ORCAPOD DISP STRL

## (undated) DEVICE — CANNULA,ADULT,SOFT-TOUCH,7'TUBE,UC: Brand: PENDING

## (undated) DEVICE — ADAPT CLN BIOGUARD AIR/H2O DISP

## (undated) DEVICE — TUBING, SUCTION, 1/4" X 10', STRAIGHT: Brand: MEDLINE

## (undated) DEVICE — Device: Brand: DEFENDO AIR/WATER/SUCTION AND BIOPSY VALVE

## (undated) DEVICE — SENSR O2 OXIMAX FNGR A/ 18IN NONSTR

## (undated) DEVICE — LN SMPL CO2 SHTRM SD STREAM W/M LUER

## (undated) DEVICE — SINGLE-USE BIOPSY FORCEPS: Brand: RADIAL JAW 4

## (undated) DEVICE — CANN O2 ETCO2 FITS ALL CONN CO2 SMPL A/ 7IN DISP LF

## (undated) DEVICE — THE TORRENT IRRIGATION SCOPE CONNECTOR IS USED WITH THE TORRENT IRRIGATION TUBING TO PROVIDE IRRIGATION FLUIDS SUCH AS STERILE WATER DURING GASTROINTESTINAL ENDOSCOPIC PROCEDURES WHEN USED IN CONJUNCTION WITH AN IRRIGATION PUMP (OR ELECTROSURGICAL UNIT).: Brand: TORRENT